# Patient Record
Sex: FEMALE | Race: OTHER
[De-identification: names, ages, dates, MRNs, and addresses within clinical notes are randomized per-mention and may not be internally consistent; named-entity substitution may affect disease eponyms.]

---

## 2021-01-11 ENCOUNTER — HOSPITAL ENCOUNTER (INPATIENT)
Dept: HOSPITAL 61 - ER | Age: 36
LOS: 3 days | Discharge: HOME | DRG: 871 | End: 2021-01-14
Attending: FAMILY MEDICINE | Admitting: FAMILY MEDICINE
Payer: OTHER GOVERNMENT

## 2021-01-11 VITALS — HEIGHT: 60 IN | WEIGHT: 190.48 LBS | BODY MASS INDEX: 37.4 KG/M2

## 2021-01-11 VITALS — DIASTOLIC BLOOD PRESSURE: 52 MMHG | SYSTOLIC BLOOD PRESSURE: 94 MMHG

## 2021-01-11 VITALS — DIASTOLIC BLOOD PRESSURE: 53 MMHG | SYSTOLIC BLOOD PRESSURE: 128 MMHG

## 2021-01-11 DIAGNOSIS — Z93.3: ICD-10-CM

## 2021-01-11 DIAGNOSIS — Z60.2: ICD-10-CM

## 2021-01-11 DIAGNOSIS — D50.9: ICD-10-CM

## 2021-01-11 DIAGNOSIS — E87.6: ICD-10-CM

## 2021-01-11 DIAGNOSIS — Z90.49: ICD-10-CM

## 2021-01-11 DIAGNOSIS — E53.8: ICD-10-CM

## 2021-01-11 DIAGNOSIS — B96.20: ICD-10-CM

## 2021-01-11 DIAGNOSIS — R13.10: ICD-10-CM

## 2021-01-11 DIAGNOSIS — F32.9: ICD-10-CM

## 2021-01-11 DIAGNOSIS — M19.90: ICD-10-CM

## 2021-01-11 DIAGNOSIS — A41.9: Primary | ICD-10-CM

## 2021-01-11 DIAGNOSIS — I21.4: ICD-10-CM

## 2021-01-11 DIAGNOSIS — Z82.49: ICD-10-CM

## 2021-01-11 DIAGNOSIS — E66.01: ICD-10-CM

## 2021-01-11 DIAGNOSIS — K59.00: ICD-10-CM

## 2021-01-11 DIAGNOSIS — K21.9: ICD-10-CM

## 2021-01-11 DIAGNOSIS — Z20.822: ICD-10-CM

## 2021-01-11 DIAGNOSIS — N39.0: ICD-10-CM

## 2021-01-11 DIAGNOSIS — Z98.84: ICD-10-CM

## 2021-01-11 LAB
% BANDS: 7 % (ref 0–9)
% LYMPHS: 9 % (ref 24–48)
% MONOS: 1 % (ref 0–10)
% SEGS: 83 % (ref 35–66)
ALBUMIN SERPL-MCNC: 2.9 G/DL (ref 3.4–5)
ALP SERPL-CCNC: 88 U/L (ref 46–116)
ALT SERPL-CCNC: 30 U/L (ref 14–59)
AMPHETAMINE/METHAMPHETAMINE: (no result)
ANION GAP SERPL CALC-SCNC: 10 MMOL/L (ref 6–14)
ANISOCYTOSIS BLD QL SMEAR: PRESENT
APTT PPP: (no result) S
AST SERPL-CCNC: 25 U/L (ref 15–37)
BACTERIA #/AREA URNS HPF: (no result) /HPF
BARBITURATES UR-MCNC: (no result) UG/ML
BASOPHILS # BLD AUTO: 0 X10^3/UL (ref 0–0.2)
BASOPHILS NFR BLD: 0 % (ref 0–3)
BENZODIAZ UR-MCNC: (no result) UG/L
BILIRUB DIRECT SERPL-MCNC: 0.1 MG/DL (ref 0–0.2)
BILIRUB SERPL-MCNC: 0.2 MG/DL (ref 0.2–1)
BILIRUB UR QL STRIP: (no result)
BUN SERPL-MCNC: 10 MG/DL (ref 7–20)
CALCIUM SERPL-MCNC: 8.8 MG/DL (ref 8.5–10.1)
CANNABINOIDS UR-MCNC: (no result) UG/L
CHLORIDE SERPL-SCNC: 102 MMOL/L (ref 98–107)
CK SERPL-CCNC: 73 U/L (ref 26–192)
CO2 SERPL-SCNC: 28 MMOL/L (ref 21–32)
COCAINE UR-MCNC: (no result) NG/ML
CREAT SERPL-MCNC: 0.9 MG/DL (ref 0.6–1)
EOSINOPHIL NFR BLD: 0.1 X10^3/UL (ref 0–0.7)
EOSINOPHIL NFR BLD: 1 % (ref 0–3)
ERYTHROCYTE [DISTWIDTH] IN BLOOD BY AUTOMATED COUNT: 19.8 % (ref 11.5–14.5)
FIBRINOGEN PPP-MCNC: (no result) MG/DL
GFR SERPLBLD BASED ON 1.73 SQ M-ARVRAT: 70.8 ML/MIN
GLUCOSE SERPL-MCNC: 97 MG/DL (ref 70–99)
HCT VFR BLD CALC: 33.7 % (ref 36–47)
HGB BLD-MCNC: 10.3 G/DL (ref 12–15.5)
HYPOCHROMIA BLD QL SMEAR: PRESENT
LIPASE: 72 U/L (ref 73–393)
LYMPHOCYTES # BLD: 1 X10^3/UL (ref 1–4.8)
LYMPHOCYTES NFR BLD AUTO: 8 % (ref 24–48)
MCH RBC QN AUTO: 22 PG (ref 25–35)
MCHC RBC AUTO-ENTMCNC: 31 G/DL (ref 31–37)
MCV RBC AUTO: 70 FL (ref 79–100)
METHADONE SERPL-MCNC: (no result) NG/ML
MICROCYTES BLD QL SMEAR: PRESENT
MONO #: 0.6 X10^3/UL (ref 0–1.1)
MONOCYTES NFR BLD: 4 % (ref 0–9)
NEUT #: 11.3 X10^3/UL (ref 1.8–7.7)
NEUTROPHILS NFR BLD AUTO: 87 % (ref 31–73)
NITRITE UR QL STRIP: POSITIVE
OPIATES UR-MCNC: (no result) NG/ML
PCP SERPL-MCNC: (no result) MG/DL
PH UR STRIP: 6 [PH]
PLATELET # BLD AUTO: 273 X10^3/UL (ref 140–400)
PLATELET # BLD EST: ADEQUATE 10*3/UL
POLYCHROMASIA BLD QL SMEAR: PRESENT
POTASSIUM SERPL-SCNC: 3.5 MMOL/L (ref 3.5–5.1)
PROT SERPL-MCNC: 7.6 G/DL (ref 6.4–8.2)
PROT UR STRIP-MCNC: 100 MG/DL
RBC # BLD AUTO: 4.8 X10^6/UL (ref 3.5–5.4)
RBC #/AREA URNS HPF: (no result) /HPF (ref 0–2)
SODIUM SERPL-SCNC: 140 MMOL/L (ref 136–145)
UROBILINOGEN UR-MCNC: 1 MG/DL
WBC # BLD AUTO: 13 X10^3/UL (ref 4–11)
WBC #/AREA URNS HPF: (no result) /HPF (ref 0–4)

## 2021-01-11 PROCEDURE — 84484 ASSAY OF TROPONIN QUANT: CPT

## 2021-01-11 PROCEDURE — 96374 THER/PROPH/DIAG INJ IV PUSH: CPT

## 2021-01-11 PROCEDURE — 80048 BASIC METABOLIC PNL TOTAL CA: CPT

## 2021-01-11 PROCEDURE — 74150 CT ABDOMEN W/O CONTRAST: CPT

## 2021-01-11 PROCEDURE — 85007 BL SMEAR W/DIFF WBC COUNT: CPT

## 2021-01-11 PROCEDURE — 93005 ELECTROCARDIOGRAM TRACING: CPT

## 2021-01-11 PROCEDURE — 80307 DRUG TEST PRSMV CHEM ANLYZR: CPT

## 2021-01-11 PROCEDURE — 96361 HYDRATE IV INFUSION ADD-ON: CPT

## 2021-01-11 PROCEDURE — 83540 ASSAY OF IRON: CPT

## 2021-01-11 PROCEDURE — 43235 EGD DIAGNOSTIC BRUSH WASH: CPT

## 2021-01-11 PROCEDURE — U0003 INFECTIOUS AGENT DETECTION BY NUCLEIC ACID (DNA OR RNA); SEVERE ACUTE RESPIRATORY SYNDROME CORONAVIRUS 2 (SARS-COV-2) (CORONAVIRUS DISEASE [COVID-19]), AMPLIFIED PROBE TECHNIQUE, MAKING USE OF HIGH THROUGHPUT TECHNOLOGIES AS DESCRIBED BY CMS-2020-01-R: HCPCS

## 2021-01-11 PROCEDURE — 80076 HEPATIC FUNCTION PANEL: CPT

## 2021-01-11 PROCEDURE — G0378 HOSPITAL OBSERVATION PER HR: HCPCS

## 2021-01-11 PROCEDURE — C9113 INJ PANTOPRAZOLE SODIUM, VIA: HCPCS

## 2021-01-11 PROCEDURE — 71250 CT THORAX DX C-: CPT

## 2021-01-11 PROCEDURE — 99285 EMERGENCY DEPT VISIT HI MDM: CPT

## 2021-01-11 PROCEDURE — 83690 ASSAY OF LIPASE: CPT

## 2021-01-11 PROCEDURE — 82607 VITAMIN B-12: CPT

## 2021-01-11 PROCEDURE — 80061 LIPID PANEL: CPT

## 2021-01-11 PROCEDURE — 83550 IRON BINDING TEST: CPT

## 2021-01-11 PROCEDURE — 87077 CULTURE AEROBIC IDENTIFY: CPT

## 2021-01-11 PROCEDURE — 81001 URINALYSIS AUTO W/SCOPE: CPT

## 2021-01-11 PROCEDURE — 94760 N-INVAS EAR/PLS OXIMETRY 1: CPT

## 2021-01-11 PROCEDURE — 96375 TX/PRO/DX INJ NEW DRUG ADDON: CPT

## 2021-01-11 PROCEDURE — 85025 COMPLETE CBC W/AUTO DIFF WBC: CPT

## 2021-01-11 PROCEDURE — 93306 TTE W/DOPPLER COMPLETE: CPT

## 2021-01-11 PROCEDURE — 83735 ASSAY OF MAGNESIUM: CPT

## 2021-01-11 PROCEDURE — 87086 URINE CULTURE/COLONY COUNT: CPT

## 2021-01-11 PROCEDURE — 81025 URINE PREGNANCY TEST: CPT

## 2021-01-11 PROCEDURE — 36415 COLL VENOUS BLD VENIPUNCTURE: CPT

## 2021-01-11 PROCEDURE — 82550 ASSAY OF CK (CPK): CPT

## 2021-01-11 PROCEDURE — 96376 TX/PRO/DX INJ SAME DRUG ADON: CPT

## 2021-01-11 PROCEDURE — 0DJ08ZZ INSPECTION OF UPPER INTESTINAL TRACT, VIA NATURAL OR ARTIFICIAL OPENING ENDOSCOPIC: ICD-10-PCS

## 2021-01-11 PROCEDURE — 96372 THER/PROPH/DIAG INJ SC/IM: CPT

## 2021-01-11 PROCEDURE — 74022 RADEX COMPL AQT ABD SERIES: CPT

## 2021-01-11 PROCEDURE — 87426 SARSCOV CORONAVIRUS AG IA: CPT

## 2021-01-11 PROCEDURE — 87186 SC STD MICRODIL/AGAR DIL: CPT

## 2021-01-11 RX ADMIN — BACITRACIN SCH MLS/HR: 5000 INJECTION, POWDER, FOR SOLUTION INTRAMUSCULAR at 13:50

## 2021-01-11 RX ADMIN — ASPIRIN SCH MG: 325 TABLET, DELAYED RELEASE ORAL at 13:50

## 2021-01-11 RX ADMIN — PANTOPRAZOLE SODIUM SCH MG: 40 INJECTION, POWDER, FOR SOLUTION INTRAVENOUS at 14:31

## 2021-01-11 RX ADMIN — ACETAMINOPHEN PRN MG: 325 TABLET, FILM COATED ORAL at 20:41

## 2021-01-11 RX ADMIN — ACETAMINOPHEN PRN MG: 325 TABLET, FILM COATED ORAL at 15:02

## 2021-01-11 RX ADMIN — ENOXAPARIN SODIUM SCH MG: 40 INJECTION SUBCUTANEOUS at 14:31

## 2021-01-11 SDOH — SOCIAL STABILITY - SOCIAL INSECURITY: PROBLEMS RELATED TO LIVING ALONE: Z60.2

## 2021-01-11 NOTE — RAD
CT scan of the chest and abdomen without contrast 1/11/2021



CLINICAL HISTORY: Abdominal pain. Tachycardia. Fever.



TECHNIQUE: Unenhanced, contiguous, 5 mm axial sections were obtained through the chest and abdomen.



One or more of the following individualized dose reduction techniques were utilized for this study:



1. Automated exposure control.

2. Adjustment of the mA and/or kV according to patient size.

3. Use of iterative reconstruction technique.





FINDINGS: Comparison is made to an acute abdominal series performed earlier today.



The heart and thoracic aorta are within normal limits. . No hilar, mediastinal or axillary lymphadeno
graham is seen.



Dependent subsegmental atelectasis is seen involving both lower lobes. No area of consolidation is se
en. No pneumothorax or pleural effusion is noted.



The liver, spleen, pancreas, adrenal glands and kidneys are within normal limits.



The abdominal aorta tapers normally. Surgical clips are seen within the gallbladder fossa consistent 
with a cholecystectomy. Postsurgical changes are seen consistent with a gastric bypass. No free fluid
 or free air is seen within the abdomen. There is no evidence of bowel obstruction. Minimal S-shaped 
curvature of the thoracolumbar spine is seen.



IMPRESSION: No acute abnormality is seen.



Electronically signed by: Sly Quintana MD (1/11/2021 8:10 PM) [id number]

## 2021-01-11 NOTE — PDOC2
GI CONSULT


Date of Service:


DATE: 21 


TIME: 14:38





Reason For Consult:


epigastric pain





HPI:


HPI:


36 y/o female seen in ER.





Went to a gathering in a garage on Saturday at noon.  Ate macaroni salad, 

meatloaf, rice, and had a shot of rum.  Soon after drinking the rum, she 

vomited.  Soon after that, epigastric pain began.


The pain is constant but worse with deep breathing or if she swallows saliva.  

Too afraid to try eating.  Has had "phlegm" in her throat all weekend but afraid

to really cough due to pain.  Given GI cocktail in ER - around the time she took

that, started feeling pain in back and neck.  Pain is described as "a hold."  





Denies reflux/heartburn, dysphagia, hematemesis, diarrhea, constipation, 

hematochezia, melena, and recent weight loss.





S/p Natalie-en-Y for weight loss in [place] in .  Had a "clot" in abdomen in 

 - required surgery to remove part of small bowel and had can ostomy for 

awhile (but has since been reversed).


Last EGD reportedly normal in [place] in 2017 or 2018.  No previous 

colonoscopy.


S/p cholecystectomy for gallstones.  No liver, pancreas, or PUD history


Denies NSAID use.


Usually gets B12 injections and iron infusions - last done in [place] on 20.  

Just moved to the  area.


Would like a warm blanket.





Labs note WBC 13, Hgb 10.3, MCV 70, RDW 19.8, ?UTI, troponin 0.138, normal LFTs,

normal lipase, negative tox screen.





PMH:


PMH:


anemia


appendectomy, cholecystectomy,  x 2, Natalie-en-Y, small bowel resection 

w/ ostomy/reversal





FH:


Family History:  No pertinent hx (denies GI cancers)





Social History:


Smoke:  No


ALCOHOL:  rare (once every 7 months)


Drugs:  None





ROS:





GEN: +fever


HEENT: +phlegm in throat


CV: Denies chest pain


RESP: Denies shortness of air, cough


GI: Per HPI


: +dark urine ("brown")


ENDO: Denies weight changes


NEURO: Denies confusion, dizziness


MSK: Denies weakness, joint pain/swelling


SKIN: Denies jaundice, pruritus





Vitals:


Vitals:





                                   Vital Signs








  Date Time  Temp Pulse Resp B/P (MAP) Pulse Ox O2 Delivery O2 Flow Rate FiO2


 


21 14:00 102.1 119 16 99/56 (70) 98 Room Air  





 102.1       











Labs:


Labs:





Laboratory Tests








Test


 21


11:09 21


11:14 21


12:10


 


Urine Collection Type Unknown   


 


Urine Color Klarissa   


 


Urine Clarity Hazy   


 


Urine pH 6.0 (<5.0-8.0)   


 


Urine Specific Gravity


 >=1.030


(1.000-1.030) 


 





 


Urine Protein


 100 mg/dL


(NEG-TRACE) 


 





 


Urine Glucose (UA)


 Negative mg/dL


(NEG) 


 





 


Urine Ketones (Stick) 15 mg/dL (NEG)   


 


Urine Blood Large (NEG)   


 


Urine Nitrite Positive (NEG)   


 


Urine Bilirubin Small (NEG)   


 


Urine Urobilinogen Dipstick


 1.0 mg/dL (0.2


mg/dL) 


 





 


Urine Leukocyte Esterase Trace (NEG)   


 


Urine RBC


 6-10 /HPF


(0-2) 


 





 


Urine WBC


 5-10 /HPF


(0-4) 


 





 


Urine Squamous Epithelial


Cells Mod /LPF 


 


 





 


Urine Bacteria


 Moderate /HPF


(0-FEW) 


 





 


Urine Mucus Mod /LPF   


 


Urine Opiates Screen Neg (NEG)   


 


Urine Methadone Screen Neg (NEG)   


 


Urine Barbiturates Neg (NEG)   


 


Urine Phencyclidine Screen Neg (NEG)   


 


Urine


Amphetamine/Methamphetamine Neg (NEG) 


 


 





 


Urine Benzodiazepines Screen Neg (NEG)   


 


Urine Cocaine Screen Neg (NEG)   


 


Urine Cannabinoids Screen Neg (NEG)   


 


Urine Ethyl Alcohol Neg (NEG)   


 


Bedside Urine HCG, Qualitative


 


 Hcg negative


(Negative) 





 


White Blood Count


 


 


 13.0 x10^3/uL


(4.0-11.0)


 


Red Blood Count


 


 


 4.80 x10^6/uL


(3.50-5.40)


 


Hemoglobin


 


 


 10.3 g/dL


(12.0-15.5)


 


Hematocrit


 


 


 33.7 %


(36.0-47.0)


 


Mean Corpuscular Volume   70 fL () 


 


Mean Corpuscular Hemoglobin   22 pg (25-35) 


 


Mean Corpuscular Hemoglobin


Concent 


 


 31 g/dL


(31-37)


 


Red Cell Distribution Width


 


 


 19.8 %


(11.5-14.5)


 


Platelet Count


 


 


 273 x10^3/uL


(140-400)


 


Neutrophils (%) (Auto)   87 % (31-73) 


 


Lymphocytes (%) (Auto)   8 % (24-48) 


 


Monocytes (%) (Auto)   4 % (0-9) 


 


Eosinophils (%) (Auto)   1 % (0-3) 


 


Basophils (%) (Auto)   0 % (0-3) 


 


Neutrophils # (Auto)


 


 


 11.3 x10^3/uL


(1.8-7.7)


 


Lymphocytes # (Auto)


 


 


 1.0 x10^3/uL


(1.0-4.8)


 


Monocytes # (Auto)


 


 


 0.6 x10^3/uL


(0.0-1.1)


 


Eosinophils # (Auto)


 


 


 0.1 x10^3/uL


(0.0-0.7)


 


Basophils # (Auto)


 


 


 0.0 x10^3/uL


(0.0-0.2)


 


Segmented Neutrophils %   83 % (35-66) 


 


Band Neutrophils %   7 % (0-9) 


 


Lymphocytes %   9 % (24-48) 


 


Monocytes %   1 % (0-10) 


 


Platelet Estimate


 


 


 Adequate


(ADEQUATE)


 


Polychromasia   Present 


 


Hypochromasia   Present 


 


Anisocytosis   Present 


 


Microcytosis   Present 


 


Sodium Level


 


 


 140 mmol/L


(136-145)


 


Potassium Level


 


 


 3.5 mmol/L


(3.5-5.1)


 


Chloride Level


 


 


 102 mmol/L


()


 


Carbon Dioxide Level


 


 


 28 mmol/L


(21-32)


 


Anion Gap   10 (6-14) 


 


Blood Urea Nitrogen


 


 


 10 mg/dL


(7-20)


 


Creatinine


 


 


 0.9 mg/dL


(0.6-1.0)


 


Estimated GFR


(Cockcroft-Gault) 


 


 70.8 





 


Glucose Level


 


 


 97 mg/dL


(70-99)


 


Calcium Level


 


 


 8.8 mg/dL


(8.5-10.1)


 


Total Bilirubin


 


 


 0.2 mg/dL


(0.2-1.0)


 


Direct Bilirubin


 


 


 0.1 mg/dL


(0.0-0.2)


 


Aspartate Amino Transf


(AST/SGOT) 


 


 25 U/L (15-37) 





 


Alanine Aminotransferase


(ALT/SGPT) 


 


 30 U/L (14-59) 





 


Alkaline Phosphatase


 


 


 88 U/L


()


 


Creatine Kinase


 


 


 73 U/L


()


 


Troponin I Quantitative


 


 


 0.138 ng/mL


(0.000-0.055)


 


Total Protein


 


 


 7.6 g/dL


(6.4-8.2)


 


Albumin


 


 


 2.9 g/dL


(3.4-5.0)


 


Lipase


 


 


 72 U/L


()











Allergies:


Coded Allergies:  


     No Known Drug Allergies (Unverified , 21)





Medications:





Current Medications








 Medications


  (Trade)  Dose


 Ordered  Sig/Reji


 Route


 PRN Reason  Start Time


 Stop Time Status Last Admin


Dose Admin


 


 Multi-Ingredient


 Mouthwash/Gargle


  (Gi Cocktail)  20 ml  1X  ONCE


 SWSW


   21 11:45


 21 11:46 DC 21 12:13





 


 Sodium Chloride  1,000 ml @ 


 75 mls/hr  [id number]


 IV


   21 13:45


    21 13:50





 


 Lorazepam


  (Ativan Inj)  2 mg  PRN Q4HRS  PRN


 IV


 ANXIETY / AGITATION  21 13:45


    21 13:56





 


 Enoxaparin Sodium


  (Lovenox 40mg


 Syringe)  40 mg  Q24H


 SQ


   21 14:00


    21 14:31





 


 Aspirin


  (Ecotrin)  325 mg  [id number]


 PO


   21 13:45


    21 13:50





 


 Pantoprazole


 Sodium


  (PROTONIX VIAL


 for IV PUSH)  40 mg  [id number]


 IVP


   21 13:45


    21 14:31





 


 Ceftriaxone Sodium


  (Rocephin)  1 gm  1X  ONCE


 IVP


   21 14:15


 21 14:16 DC 21 14:31














Imaging:


Imaging:


none





PE:





GEN: appears uncomfortable


HEENT: Atraumatic, PERRL


LUNGS: clear anteriorly - poor effort


HEART: tachycardic - HR 120s when I saw


ABD: quiet, soft, epigastric tenderness, midline scar


EXTREMITY: No edema


SKIN: many tattoos


NEURO/PSYCH: A & O 3





A/P:


A/P:


Vomiting (no recurrence), epigastric pain


Fever, tachycardia


Leukocytosis, microcytic anemia, ?UTI, mildly elevated troponin


S/p Natalie-en-Y


H/o "clot" requiring SB resection/ostomy


CRC screen - none, average risk


S/p cholecystectomy





--


Agree w/ IV PPI.  


Has cardiac diet ordered - would keep NPO.


Abd x-ray ordered per Dr. Phelps - await this.


Will return to see w/ Dr. Jade.











ROMELIA CUEVA         2021 14:56

## 2021-01-11 NOTE — PDOC1
History and Physical


Date of Admission


Date of Admission


DATE: 21 


TIME: 13:05





Identification/Chief Complaint


Chief Complaint


epigastric pain after etoh intake. fever





History of Present Illness


History of Present Illness


 SEEN IN ER WITH ELEVATED TROPONIN ,  epigastric pain.  Patient denies any 

nausea or vomiting.  Patient states her epigastric pain increases when she eats 

or she drinks   





she took Tylenol 650 mg OTC p.o. this morning without relief of pain.  Patient 

denies shortness of breath chest congestion or chest pains.  Patient  DENIES 

constipation or diarrhea.  Patient states her last normal BM was 5 days ago and 

this is normal for her.  





 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 

bypass, states she wore a colostomy for about 2 months, and no longer wears a 

colostomy as it was revised.





Past Medical History


Past Medical History


                                                            


 Past Medical History 


Past Medical History


Past Medical History:  No Pertinent History


Past Surgical History:  Appendectomy, Cholecystectomy, , Other


Additional Past Surgical Histo:  gastric bypass for mesenteric clot;


Smoking Status:  Never Smoker


Alcohol Use:  Occasionally


Drug Use:  None








fhx obesity


GI:  Gastritis


Heme/Onc:  No pertinent hx





Family History


Family History:  Alcohol Abuse, Hypertension





Social History


Smoke:  No


ALCOHOL:  social


Drugs:  None





Current Medications


Current Medications





Current Medications


Multi-Ingredient Mouthwash/Gargle (Gi Cocktail) 20 ml 1X  ONCE SWSW  Last 

administered on 21at 12:13;  Start 21 at 11:45;  Stop 21 at 

11:46;  Status DC





Allergies


Allergies:  


Coded Allergies:  


     No Known Drug Allergies (Unverified , 21)





ROS


General:  No: Chills, Night Sweats, Fatigue, Malaise, Appetite, Other


PSYCHOLOGICAL ROS:  YES: Anxiety; 


   No: Behavioral Disorder, Concentration difficultie, Decreased libido, 

Depression, Disorientation, Hallucinations, Hostility, Irritablity, Memory 

difficulties, Mood Swings, Obsessive thoughts, Physical abuse, Sexual abuse, 

Sleep disturbances, Suicidal ideation, Other


Eyes:  No Blurry vision, No Decreased vision, No Double vision, No Dry eyes, No 

Excessive tearing, No Eye Pain, No Itchy Eyes, No Loss of vision, No 

Photophobia, No Scotomata, No Uses contacts, No Uses glasses, No Other


HEENT:  No: Heacaches, Visual Changes, Hearing change, Nasal congestion, Nasal 

discharge, Oral lesions, Sinus pain, Sore Throat, Epistaxis, Sneezing, Snoring, 

Tinnitus, Vertigo, Vocal changes, Other


ALLERGY AND IMMUNOLOGY:  No: Hives, Insect Bite Sensitivity, Itchy/Watery Eyes, 

Nasal Congestion, Post Nasal Drip, Seasonal Allergies, Other


Hematological and Lymphatic:  No: Bleeding Problems, Blood Clots, Blood 

Transfusions, Brusing, Night Sweats, Pallor, Swollen Lymph Nodes, Other


ENDOCRINE:  No: Breast Changes, Galactorrhea, Hair Pattern Changes, Hot Flashes,

Malaise/lethargy, Mood Swings, Palpitations, Polydipsia/polyuria, Skin Changes, 

Temperature Intolerance, Unexpected Weight Changes, Other


Respiratory:  No: Cough, Hemoptysis, Orthopnea, Pleuritic Pain, Shortness of 

breath, SOB with excertion, Sputum Changes, Stridor, Tachypnea, Wheezing, Other


Gastrointestinal:  Yes Abdominal Pain; 


   No Nausea, No Vomiting, No Diarrhea, No Constipation, No Melena, No 

Hematochezia, No Other


Genitourinary:  No Dysuria, No Frequency, No Incontinence, No Hematuria, No 

Retention, No Discharge, No Urgency, No Pain, No Flank Pain, No Other, No , No ,

No , No , No , No , No 


Musculoskeletal:  No Gait Disturbance, No Joint Pain, No Joint Stiffness, No 

Joint Swelling, No Muscle Pain, No Muscular Weakness, No Pain In:, No Swelling 

In:, No Other


Neurological:  No Behavorial Changes, No Bowel/Bladder ControlChng, No 

Confusion, No Dizziness, No Gait Disturbance, No Headaches, No Impaired 

Coord/balance, No Memory Loss, No Numbness/Tingling, No Seizures, No Speech 

Problems, No Tremors, No Visual Changes, No Weakness, No Other


Skin:  No Dry Skin, No Eczema, No Hair Changes, No Lumps, No Mole Changes, No 

Mottling, No Nail Changes, No Pruritus, No Rash, No Skin Lesion Changes, No 

Other, No Acne





Physical Exam


Physical Exam


onstitutional: Well developed, well nourished, mild acute distress, non-toxic 

appearance. 


HENT: Normocephalic, atraumatic, bilateral external ears normal, oropharynx 

moist, no oral exudates, nose normal. 


Eyes: PERRLA, EOMI, conjunctiva normal, no discharge.  


Neck: Normal range of motion, no tenderness, supple, no stridor.  


Cardiovascular:Heart rate regular rhythm, no murmur, heart sounds S1-S2 to auscu

ltation.


Lungs & Thorax:  Bilateral breath sounds clear to auscultation all lung fields.


Abdomen: Bowel sounds normal, soft, no tenderness, no masses, no pulsatile 

masses.  Pain to palpation over epigastric area.  Abdominal surgical scars well-

healed.


Skin: Warm, dry, no erythema, no rash.  


Back: No tenderness, no CVA tenderness.  


Extremities: No tenderness, no cyanosis, no clubbing, ROM intact, no edema.  


Neurologic: Alert and oriented X 3, normal motor function, normal sensory 

function, no focal deficits noted. 


Psychologic: Affect normal, judgment normal, mood normal.


General:  Alert, Oriented X3, Cooperative, mild distress


HEENT:  EOMI, Mucous membr. moist/pink


Lungs:  Clear to auscultation


Heart:  RRR


Breasts:  Not examined


Abdomen:  Normal bowel sounds, No hepatosplenomegaly


Rectal Exam:  not examined


PELVIC:  Examination not indicated


Extremities:  No clubbing, No cyanosis, Normal pulses


Skin:  No rashes


Neuro:  Normal speech, Sensation intact, Cranial nerves 3-12 NL


Psych/Mental Status:  Mental status NL, Mood NL





Vitals


Vitals





Vital Signs








  Date Time  Temp Pulse Resp B/P (MAP) Pulse Ox O2 Delivery O2 Flow Rate FiO2


 


21 11:08 98.0 108 20 135/58 100   





 98.0       











Labs


Labs





Laboratory Tests








Test


 21


11:09 21


11:14 21


12:10


 


Urine Collection Type Unknown   


 


Urine Color Klarissa   


 


Urine Clarity Hazy   


 


Urine pH 6.0 (<5.0-8.0)   


 


Urine Specific Gravity


 >=1.030


(1.000-1.030) 


 





 


Urine Protein


 100 mg/dL


(NEG-TRACE) 


 





 


Urine Glucose (UA)


 Negative mg/dL


(NEG) 


 





 


Urine Ketones (Stick) 15 mg/dL (NEG)   


 


Urine Blood Large (NEG)   


 


Urine Nitrite Positive (NEG)   


 


Urine Bilirubin Small (NEG)   


 


Urine Urobilinogen Dipstick


 1.0 mg/dL (0.2


mg/dL) 


 





 


Urine Leukocyte Esterase Trace (NEG)   


 


Urine RBC


 6-10 /HPF


(0-2) 


 





 


Urine WBC


 5-10 /HPF


(0-4) 


 





 


Urine Squamous Epithelial


Cells Mod /LPF 


 


 





 


Urine Bacteria


 Moderate /HPF


(0-FEW) 


 





 


Urine Mucus Mod /LPF   


 


Urine Opiates Screen Neg (NEG)   


 


Urine Methadone Screen Neg (NEG)   


 


Urine Barbiturates Neg (NEG)   


 


Urine Phencyclidine Screen Neg (NEG)   


 


Urine


Amphetamine/Methamphetamine Neg (NEG) 


 


 





 


Urine Benzodiazepines Screen Neg (NEG)   


 


Urine Cocaine Screen Neg (NEG)   


 


Urine Cannabinoids Screen Neg (NEG)   


 


Urine Ethyl Alcohol Neg (NEG)   


 


Bedside Urine HCG, Qualitative


 


 Hcg negative


(Negative) 





 


White Blood Count


 


 


 13.0 x10^3/uL


(4.0-11.0)


 


Red Blood Count


 


 


 4.80 x10^6/uL


(3.50-5.40)


 


Hemoglobin


 


 


 10.3 g/dL


(12.0-15.5)


 


Hematocrit


 


 


 33.7 %


(36.0-47.0)


 


Mean Corpuscular Volume   70 fL () 


 


Mean Corpuscular Hemoglobin   22 pg (25-35) 


 


Mean Corpuscular Hemoglobin


Concent 


 


 31 g/dL


(31-37)


 


Red Cell Distribution Width


 


 


 19.8 %


(11.5-14.5)


 


Platelet Count


 


 


 273 x10^3/uL


(140-400)


 


Neutrophils (%) (Auto)   87 % (31-73) 


 


Lymphocytes (%) (Auto)   8 % (24-48) 


 


Monocytes (%) (Auto)   4 % (0-9) 


 


Eosinophils (%) (Auto)   1 % (0-3) 


 


Basophils (%) (Auto)   0 % (0-3) 


 


Neutrophils # (Auto)


 


 


 11.3 x10^3/uL


(1.8-7.7)


 


Lymphocytes # (Auto)


 


 


 1.0 x10^3/uL


(1.0-4.8)


 


Monocytes # (Auto)


 


 


 0.6 x10^3/uL


(0.0-1.1)


 


Eosinophils # (Auto)


 


 


 0.1 x10^3/uL


(0.0-0.7)


 


Basophils # (Auto)


 


 


 0.0 x10^3/uL


(0.0-0.2)


 


Segmented Neutrophils %   83 % (35-66) 


 


Band Neutrophils %   7 % (0-9) 


 


Lymphocytes %   9 % (24-48) 


 


Monocytes %   1 % (0-10) 


 


Platelet Estimate


 


 


 Adequate


(ADEQUATE)


 


Polychromasia   Present 


 


Hypochromasia   Present 


 


Anisocytosis   Present 


 


Microcytosis   Present 


 


Sodium Level


 


 


 140 mmol/L


(136-145)


 


Potassium Level


 


 


 3.5 mmol/L


(3.5-5.1)


 


Chloride Level


 


 


 102 mmol/L


()


 


Carbon Dioxide Level


 


 


 28 mmol/L


(21-32)


 


Anion Gap   10 (6-14) 


 


Blood Urea Nitrogen


 


 


 10 mg/dL


(7-20)


 


Creatinine


 


 


 0.9 mg/dL


(0.6-1.0)


 


Estimated GFR


(Cockcroft-Gault) 


 


 70.8 





 


Glucose Level


 


 


 97 mg/dL


(70-99)


 


Calcium Level


 


 


 8.8 mg/dL


(8.5-10.1)


 


Total Bilirubin


 


 


 0.2 mg/dL


(0.2-1.0)


 


Direct Bilirubin


 


 


 0.1 mg/dL


(0.0-0.2)


 


Aspartate Amino Transf


(AST/SGOT) 


 


 25 U/L (15-37) 





 


Alanine Aminotransferase


(ALT/SGPT) 


 


 30 U/L (14-59) 





 


Alkaline Phosphatase


 


 


 88 U/L


()


 


Creatine Kinase


 


 


 73 U/L


()


 


Troponin I Quantitative


 


 


 0.138 ng/mL


(0.000-0.055)


 


Total Protein


 


 


 7.6 g/dL


(6.4-8.2)


 


Albumin


 


 


 2.9 g/dL


(3.4-5.0)


 


Lipase


 


 


 72 U/L


()








Laboratory Tests








Test


 21


11:09 21


11:14 21


12:10


 


Urine Collection Type Unknown   


 


Urine Color Klarissa   


 


Urine Clarity Hazy   


 


Urine pH 6.0 (<5.0-8.0)   


 


Urine Specific Gravity


 >=1.030


(1.000-1.030) 


 





 


Urine Protein


 100 mg/dL


(NEG-TRACE) 


 





 


Urine Glucose (UA)


 Negative mg/dL


(NEG) 


 





 


Urine Ketones (Stick) 15 mg/dL (NEG)   


 


Urine Blood Large (NEG)   


 


Urine Nitrite Positive (NEG)   


 


Urine Bilirubin Small (NEG)   


 


Urine Urobilinogen Dipstick


 1.0 mg/dL (0.2


mg/dL) 


 





 


Urine Leukocyte Esterase Trace (NEG)   


 


Urine RBC


 6-10 /HPF


(0-2) 


 





 


Urine WBC


 5-10 /HPF


(0-4) 


 





 


Urine Squamous Epithelial


Cells Mod /LPF 


 


 





 


Urine Bacteria


 Moderate /HPF


(0-FEW) 


 





 


Urine Mucus Mod /LPF   


 


Urine Opiates Screen Neg (NEG)   


 


Urine Methadone Screen Neg (NEG)   


 


Urine Barbiturates Neg (NEG)   


 


Urine Phencyclidine Screen Neg (NEG)   


 


Urine


Amphetamine/Methamphetamine Neg (NEG) 


 


 





 


Urine Benzodiazepines Screen Neg (NEG)   


 


Urine Cocaine Screen Neg (NEG)   


 


Urine Cannabinoids Screen Neg (NEG)   


 


Urine Ethyl Alcohol Neg (NEG)   


 


Bedside Urine HCG, Qualitative


 


 Hcg negative


(Negative) 





 


White Blood Count


 


 


 13.0 x10^3/uL


(4.0-11.0)


 


Red Blood Count


 


 


 4.80 x10^6/uL


(3.50-5.40)


 


Hemoglobin


 


 


 10.3 g/dL


(12.0-15.5)


 


Hematocrit


 


 


 33.7 %


(36.0-47.0)


 


Mean Corpuscular Volume   70 fL () 


 


Mean Corpuscular Hemoglobin   22 pg (25-35) 


 


Mean Corpuscular Hemoglobin


Concent 


 


 31 g/dL


(31-37)


 


Red Cell Distribution Width


 


 


 19.8 %


(11.5-14.5)


 


Platelet Count


 


 


 273 x10^3/uL


(140-400)


 


Neutrophils (%) (Auto)   87 % (31-73) 


 


Lymphocytes (%) (Auto)   8 % (24-48) 


 


Monocytes (%) (Auto)   4 % (0-9) 


 


Eosinophils (%) (Auto)   1 % (0-3) 


 


Basophils (%) (Auto)   0 % (0-3) 


 


Neutrophils # (Auto)


 


 


 11.3 x10^3/uL


(1.8-7.7)


 


Lymphocytes # (Auto)


 


 


 1.0 x10^3/uL


(1.0-4.8)


 


Monocytes # (Auto)


 


 


 0.6 x10^3/uL


(0.0-1.1)


 


Eosinophils # (Auto)


 


 


 0.1 x10^3/uL


(0.0-0.7)


 


Basophils # (Auto)


 


 


 0.0 x10^3/uL


(0.0-0.2)


 


Segmented Neutrophils %   83 % (35-66) 


 


Band Neutrophils %   7 % (0-9) 


 


Lymphocytes %   9 % (24-48) 


 


Monocytes %   1 % (0-10) 


 


Platelet Estimate


 


 


 Adequate


(ADEQUATE)


 


Polychromasia   Present 


 


Hypochromasia   Present 


 


Anisocytosis   Present 


 


Microcytosis   Present 


 


Sodium Level


 


 


 140 mmol/L


(136-145)


 


Potassium Level


 


 


 3.5 mmol/L


(3.5-5.1)


 


Chloride Level


 


 


 102 mmol/L


()


 


Carbon Dioxide Level


 


 


 28 mmol/L


(21-32)


 


Anion Gap   10 (6-14) 


 


Blood Urea Nitrogen


 


 


 10 mg/dL


(7-20)


 


Creatinine


 


 


 0.9 mg/dL


(0.6-1.0)


 


Estimated GFR


(Cockcroft-Gault) 


 


 70.8 





 


Glucose Level


 


 


 97 mg/dL


(70-99)


 


Calcium Level


 


 


 8.8 mg/dL


(8.5-10.1)


 


Total Bilirubin


 


 


 0.2 mg/dL


(0.2-1.0)


 


Direct Bilirubin


 


 


 0.1 mg/dL


(0.0-0.2)


 


Aspartate Amino Transf


(AST/SGOT) 


 


 25 U/L (15-37) 





 


Alanine Aminotransferase


(ALT/SGPT) 


 


 30 U/L (14-59) 





 


Alkaline Phosphatase


 


 


 88 U/L


()


 


Creatine Kinase


 


 


 73 U/L


()


 


Troponin I Quantitative


 


 


 0.138 ng/mL


(0.000-0.055)


 


Total Protein


 


 


 7.6 g/dL


(6.4-8.2)


 


Albumin


 


 


 2.9 g/dL


(3.4-5.0)


 


Lipase


 


 


 72 U/L


()











VTE Prophylaxis Ordered


VTE Prophylaxis Devices:  Yes


VTE Pharmacological Prophylaxi:  Yes





Assessment/Plan


Assessment/Plan


impression





1. INTRACTABLE EPIGASTRIC PAIN, probable alcohol induced gastritis


2. elevated troponin i, //suspect stress induced ischemia type 2


3. morbid obesity


4. moderate sporatic alcohol use


5. remote mesenteric clot














plan===============





admit


ASA


NPO


IV FLUID SUPPORT


GI CONSULT


IV PROTONIX 40 MG Q 24 HRS


Cardiology consult


dvt prophylaxis, sq heparin


trend troponin i


echo


cvc bed





Justifications for Admission


Other Justification














DELFINA CRUZ MD          2021 13:06

## 2021-01-11 NOTE — RAD
2 view abdominal series and PA view chest x-ray



Clinical indications: Fever and epigastric pain. History of gastric bypass 2011.



FINDINGS: Mildly dilated loops of small bowel are seen within the abdomen which is not out of proport
ion to the amount of air within the colon.. Small multiple air-fluid levels are seen. Mild fecal rete
ntion is seen throughout the colon and rectosigmoid region. No free intraperitoneal air is seen. Surg
ical clips are seen within left upper quadrant abdomen and cholecystectomy clips are apparent.



Chest x-ray demonstrates no acute lung infiltrate or pulmonary edema or pleural effusion or pneumotho
rax. Heart size and pulmonary vasculature and mediastinum and both yon are unremarkable.



IMPRESSION: Air-fluid levels which may be indicative of enteritis.



Electronically signed by: Jean Jarvis MD (1/11/2021 3:27 PM) [id number]

## 2021-01-11 NOTE — NUR
Patient arrived to room 209 via bed from ER at 1700. Patient A&OX4. Patient states she is 
only in pain when she swallows. 



The patient, FRANCISCO CASTELLANOS, 34 y/o, F admitted by DELFINA CRUZ MD, was given 
written information regarding hospital policies, unit procedures and contact persons.  



Valuables were checked and noted.



Will continue to monitor.

## 2021-01-12 VITALS — SYSTOLIC BLOOD PRESSURE: 97 MMHG | DIASTOLIC BLOOD PRESSURE: 52 MMHG

## 2021-01-12 VITALS — SYSTOLIC BLOOD PRESSURE: 102 MMHG | DIASTOLIC BLOOD PRESSURE: 60 MMHG

## 2021-01-12 VITALS — SYSTOLIC BLOOD PRESSURE: 103 MMHG | DIASTOLIC BLOOD PRESSURE: 64 MMHG

## 2021-01-12 VITALS — SYSTOLIC BLOOD PRESSURE: 93 MMHG | DIASTOLIC BLOOD PRESSURE: 55 MMHG

## 2021-01-12 VITALS — DIASTOLIC BLOOD PRESSURE: 56 MMHG | SYSTOLIC BLOOD PRESSURE: 108 MMHG

## 2021-01-12 VITALS — DIASTOLIC BLOOD PRESSURE: 62 MMHG | SYSTOLIC BLOOD PRESSURE: 85 MMHG

## 2021-01-12 VITALS — DIASTOLIC BLOOD PRESSURE: 65 MMHG | SYSTOLIC BLOOD PRESSURE: 127 MMHG

## 2021-01-12 VITALS — SYSTOLIC BLOOD PRESSURE: 100 MMHG | DIASTOLIC BLOOD PRESSURE: 53 MMHG

## 2021-01-12 VITALS — DIASTOLIC BLOOD PRESSURE: 55 MMHG | SYSTOLIC BLOOD PRESSURE: 96 MMHG

## 2021-01-12 VITALS — DIASTOLIC BLOOD PRESSURE: 56 MMHG | SYSTOLIC BLOOD PRESSURE: 94 MMHG

## 2021-01-12 VITALS — DIASTOLIC BLOOD PRESSURE: 56 MMHG | SYSTOLIC BLOOD PRESSURE: 120 MMHG

## 2021-01-12 VITALS — DIASTOLIC BLOOD PRESSURE: 59 MMHG | SYSTOLIC BLOOD PRESSURE: 111 MMHG

## 2021-01-12 VITALS — DIASTOLIC BLOOD PRESSURE: 82 MMHG | SYSTOLIC BLOOD PRESSURE: 122 MMHG

## 2021-01-12 LAB
BASOPHILS # BLD AUTO: 0.1 X10^3/UL (ref 0–0.2)
BASOPHILS NFR BLD: 1 % (ref 0–3)
CHOLEST SERPL-MCNC: 157 MG/DL (ref 0–200)
CHOLEST/HDLC SERPL: 5.2 {RATIO}
EOSINOPHIL NFR BLD: 0.2 X10^3/UL (ref 0–0.7)
EOSINOPHIL NFR BLD: 2 % (ref 0–3)
ERYTHROCYTE [DISTWIDTH] IN BLOOD BY AUTOMATED COUNT: 19.8 % (ref 11.5–14.5)
HCT VFR BLD CALC: 31.2 % (ref 36–47)
HDLC SERPL-MCNC: 30 MG/DL (ref 40–60)
HGB BLD-MCNC: 9.8 G/DL (ref 12–15.5)
LDLC: 101 MG/DL (ref 0–100)
LYMPHOCYTES # BLD: 1.4 X10^3/UL (ref 1–4.8)
LYMPHOCYTES NFR BLD AUTO: 14 % (ref 24–48)
MCH RBC QN AUTO: 22 PG (ref 25–35)
MCHC RBC AUTO-ENTMCNC: 32 G/DL (ref 31–37)
MCV RBC AUTO: 70 FL (ref 79–100)
MONO #: 0.4 X10^3/UL (ref 0–1.1)
MONOCYTES NFR BLD: 4 % (ref 0–9)
NEUT #: 7.8 X10^3/UL (ref 1.8–7.7)
NEUTROPHILS NFR BLD AUTO: 79 % (ref 31–73)
PLATELET # BLD AUTO: 252 X10^3/UL (ref 140–400)
RBC # BLD AUTO: 4.45 X10^6/UL (ref 3.5–5.4)
TRIGL SERPL-MCNC: 128 MG/DL (ref 0–150)
VLDLC: 26 MG/DL (ref 0–40)
WBC # BLD AUTO: 9.8 X10^3/UL (ref 4–11)

## 2021-01-12 RX ADMIN — ACETAMINOPHEN PRN MG: 325 TABLET, FILM COATED ORAL at 08:35

## 2021-01-12 RX ADMIN — BACITRACIN SCH MLS/HR: 5000 INJECTION, POWDER, FOR SOLUTION INTRAMUSCULAR at 15:01

## 2021-01-12 RX ADMIN — BACITRACIN SCH MLS/HR: 5000 INJECTION, POWDER, FOR SOLUTION INTRAMUSCULAR at 00:45

## 2021-01-12 RX ADMIN — PANTOPRAZOLE SODIUM SCH MG: 40 INJECTION, POWDER, FOR SOLUTION INTRAVENOUS at 08:36

## 2021-01-12 RX ADMIN — ASPIRIN SCH MG: 325 TABLET, DELAYED RELEASE ORAL at 08:35

## 2021-01-12 RX ADMIN — ENOXAPARIN SODIUM SCH MG: 40 INJECTION SUBCUTANEOUS at 18:29

## 2021-01-12 RX ADMIN — CEFTRIAXONE SCH GM: 1 INJECTION, POWDER, FOR SOLUTION INTRAMUSCULAR; INTRAVENOUS at 08:00

## 2021-01-12 NOTE — NUR
SS following for discharge planning. SS reviewed pt chart and discussed with pt RN. Pt is 
from home with spouse and is currently on room air. Pt on IV Rocephin. COVID19 negative. Pt 
having EGD. Self pay. SS will continue to follow for discharge planning.

## 2021-01-12 NOTE — PDOC
[facility] [facility] PROGRESS NOTE


Date of Service


DOS:


DATE: 1/12/21 


TIME: 10:24





Chief Complaint


Chief Complaint


Chest pain, elevated troponin, N STEMI, UTI





History of Present Illness


History of Present Illness


1/12: 


-Patient seen and examined. 


-Noble RN. Noble . 


-Chart reviewed.





Vitals/I&O


Vitals/I&O:





                                   Vital Signs








  Date Time  Temp Pulse Resp B/P (MAP) Pulse Ox O2 Delivery O2 Flow Rate FiO2


 


1/12/21 09:05  113  102/60 (74)    


 


1/12/21 07:00 99.7  24  100 Room Air  





 99.7       














                                    I & O   


 


 1/11/21 1/11/21 1/12/21





 15:00 23:00 07:00


 


Intake Total   0 ml


 


Output Total  150 ml 


 


Balance  -150 ml 0 ml











Physical Exam


General:  Alert, Oriented X3, Cooperative, mild distress


Abdomen:  Normal bowel sounds, No hepatosplenomegaly


Extremities:  No clubbing, No cyanosis, Normal pulses


Skin:  No rashes





Labs


Labs:





Laboratory Tests








Test


 1/11/21


11:09 1/11/21


11:14 1/11/21


12:10 1/11/21


14:51


 


Urine Collection Type Unknown    


 


Urine Color Klarissa    


 


Urine Clarity Hazy    


 


Urine pH 6.0 (<5.0-8.0)    


 


Urine Specific Gravity


 >=1.030


(1.000-1.030) 


 


 





 


Urine Protein


 100 mg/dL


(NEG-TRACE) 


 


 





 


Urine Glucose (UA)


 Negative mg/dL


(NEG) 


 


 





 


Urine Ketones (Stick) 15 mg/dL (NEG)    


 


Urine Blood Large (NEG)    


 


Urine Nitrite Positive (NEG)    


 


Urine Bilirubin Small (NEG)    


 


Urine Urobilinogen Dipstick


 1.0 mg/dL (0.2


mg/dL) 


 


 





 


Urine Leukocyte Esterase Trace (NEG)    


 


Urine RBC


 6-10 /HPF


(0-2) 


 


 





 


Urine WBC


 5-10 /HPF


(0-4) 


 


 





 


Urine Squamous Epithelial


Cells Mod /LPF 


 


 


 





 


Urine Bacteria


 Moderate /HPF


(0-FEW) 


 


 





 


Urine Mucus Mod /LPF    


 


Urine Opiates Screen Neg (NEG)    


 


Urine Methadone Screen Neg (NEG)    


 


Urine Barbiturates Neg (NEG)    


 


Urine Phencyclidine Screen Neg (NEG)    


 


Urine


Amphetamine/Methamphetamine Neg (NEG) 


 


 


 





 


Urine Benzodiazepines Screen Neg (NEG)    


 


Urine Cocaine Screen Neg (NEG)    


 


Urine Cannabinoids Screen Neg (NEG)    


 


Urine Ethyl Alcohol Neg (NEG)    


 


Bedside Urine HCG, Qualitative


 


 Hcg negative


(Negative) 


 





 


White Blood Count


 


 


 13.0 x10^3/uL


(4.0-11.0) 





 


Red Blood Count


 


 


 4.80 x10^6/uL


(3.50-5.40) 





 


Hemoglobin


 


 


 10.3 g/dL


(12.0-15.5) 





 


Hematocrit


 


 


 33.7 %


(36.0-47.0) 





 


Mean Corpuscular Volume   70 fL ()  


 


Mean Corpuscular Hemoglobin   22 pg (25-35)  


 


Mean Corpuscular Hemoglobin


Concent 


 


 31 g/dL


(31-37) 





 


Red Cell Distribution Width


 


 


 19.8 %


(11.5-14.5) 





 


Platelet Count


 


 


 273 x10^3/uL


(140-400) 





 


Neutrophils (%) (Auto)   87 % (31-73)  


 


Lymphocytes (%) (Auto)   8 % (24-48)  


 


Monocytes (%) (Auto)   4 % (0-9)  


 


Eosinophils (%) (Auto)   1 % (0-3)  


 


Basophils (%) (Auto)   0 % (0-3)  


 


Neutrophils # (Auto)


 


 


 11.3 x10^3/uL


(1.8-7.7) 





 


Lymphocytes # (Auto)


 


 


 1.0 x10^3/uL


(1.0-4.8) 





 


Monocytes # (Auto)


 


 


 0.6 x10^3/uL


(0.0-1.1) 





 


Eosinophils # (Auto)


 


 


 0.1 x10^3/uL


(0.0-0.7) 





 


Basophils # (Auto)


 


 


 0.0 x10^3/uL


(0.0-0.2) 





 


Segmented Neutrophils %   83 % (35-66)  


 


Band Neutrophils %   7 % (0-9)  


 


Lymphocytes %   9 % (24-48)  


 


Monocytes %   1 % (0-10)  


 


Platelet Estimate


 


 


 Adequate


(ADEQUATE) 





 


Polychromasia   Present  


 


Hypochromasia   Present  


 


Anisocytosis   Present  


 


Microcytosis   Present  


 


Sodium Level


 


 


 140 mmol/L


(136-145) 





 


Potassium Level


 


 


 3.5 mmol/L


(3.5-5.1) 





 


Chloride Level


 


 


 102 mmol/L


() 





 


Carbon Dioxide Level


 


 


 28 mmol/L


(21-32) 





 


Anion Gap   10 (6-14)  


 


Blood Urea Nitrogen


 


 


 10 mg/dL


(7-20) 





 


Creatinine


 


 


 0.9 mg/dL


(0.6-1.0) 





 


Estimated GFR


(Cockcroft-Gault) 


 


 70.8 


 





 


Glucose Level


 


 


 97 mg/dL


(70-99) 





 


Calcium Level


 


 


 8.8 mg/dL


(8.5-10.1) 





 


Total Bilirubin


 


 


 0.2 mg/dL


(0.2-1.0) 





 


Direct Bilirubin


 


 


 0.1 mg/dL


(0.0-0.2) 





 


Aspartate Amino Transf


(AST/SGOT) 


 


 25 U/L (15-37) 


 





 


Alanine Aminotransferase


(ALT/SGPT) 


 


 30 U/L (14-59) 


 





 


Alkaline Phosphatase


 


 


 88 U/L


() 





 


Creatine Kinase


 


 


 73 U/L


() 





 


Troponin I Quantitative


 


 


 0.138 ng/mL


(0.000-0.055) 0.159 ng/mL


(0.000-0.055)


 


Total Protein


 


 


 7.6 g/dL


(6.4-8.2) 





 


Albumin


 


 


 2.9 g/dL


(3.4-5.0) 





 


Lipase


 


 


 72 U/L


() 





 


Test


 1/12/21


07:20 


 


 





 


White Blood Count


 9.8 x10^3/uL


(4.0-11.0) 


 


 





 


Red Blood Count


 4.45 x10^6/uL


(3.50-5.40) 


 


 





 


Hemoglobin


 9.8 g/dL


(12.0-15.5) 


 


 





 


Hematocrit


 31.2 %


(36.0-47.0) 


 


 





 


Mean Corpuscular Volume 70 fL ()    


 


Mean Corpuscular Hemoglobin 22 pg (25-35)    


 


Mean Corpuscular Hemoglobin


Concent 32 g/dL


(31-37) 


 


 





 


Red Cell Distribution Width


 19.8 %


(11.5-14.5) 


 


 





 


Platelet Count


 252 x10^3/uL


(140-400) 


 


 





 


Neutrophils (%) (Auto) 79 % (31-73)    


 


Lymphocytes (%) (Auto) 14 % (24-48)    


 


Monocytes (%) (Auto) 4 % (0-9)    


 


Eosinophils (%) (Auto) 2 % (0-3)    


 


Basophils (%) (Auto) 1 % (0-3)    


 


Neutrophils # (Auto)


 7.8 x10^3/uL


(1.8-7.7) 


 


 





 


Lymphocytes # (Auto)


 1.4 x10^3/uL


(1.0-4.8) 


 


 





 


Monocytes # (Auto)


 0.4 x10^3/uL


(0.0-1.1) 


 


 





 


Eosinophils # (Auto)


 0.2 x10^3/uL


(0.0-0.7) 


 


 





 


Basophils # (Auto)


 0.1 x10^3/uL


(0.0-0.2) 


 


 





 


Creatine Kinase


 49 U/L


() 


 


 





 


Troponin I Quantitative


 0.058 ng/mL


(0.000-0.055) 


 


 





 


Triglycerides Level


 128 mg/dL


(0-150) 


 


 





 


Cholesterol Level


 157 mg/dL


(0-200) 


 


 





 


LDL Cholesterol, Calculated


 101 mg/dL


(0-100) 


 


 





 


VLDL Cholesterol, Calculated


 26 mg/dL


(0-40) 


 


 





 


Non-HDL Cholesterol Calculated


 127 mg/dL


(0-129) 


 


 





 


HDL Cholesterol


 30 mg/dL


(40-60) 


 


 





 


Cholesterol/HDL Ratio 5.2    











Review of Systems


Review of Systems:


Denies shortness of breath. Denies headache.





Assessment and Plan


Assessmemt and Plan


Problems


Medical Problems:


(1) Chest pain


Status: Acute  





(2) NSTEMI (non-ST elevation myocardial infarction)


Status: Acute  





(3) UTI (urinary tract infection)


Status: Acute





Chest pain, elevated troponin, N STEMI, UTI





Plan 


1 Cardiac monitoring


2 Await GI input


3 PPIs


4 Await cardiology input


5 Serial enzymes


6 Serial EKGs


7 Await echo  








Comment


Review of Relevant


I have reviewed the following items ronda (where applicable) has been applied.


Medications:





Current Medications








 Medications


  (Trade)  Dose


 Ordered  Sig/Reji


 Route


 PRN Reason  Start Time


 Stop Time Status Last Admin


Dose Admin


 


 Multi-Ingredient


 Mouthwash/Gargle


  (Gi Cocktail)  20 ml  1X  ONCE


 SWSW


   1/11/21 11:45


 1/11/21 11:46 DC 1/11/21 12:13





 


 Sodium Chloride  1,000 ml @ 


 75 mls/hr  [id number]


 IV


   1/11/21 13:45


    1/12/21 00:45





 


 Acetaminophen


  (Tylenol)  650 mg  PRN Q4HRS  PRN


 PO


 TEMP OVER 100.4F OR MILD PAIN  1/11/21 13:45


    1/12/21 08:35





 


 Lorazepam


  (Ativan Inj)  2 mg  PRN Q4HRS  PRN


 IV


 ANXIETY / AGITATION  1/11/21 13:45


    1/12/21 09:18





 


 Enoxaparin Sodium


  (Lovenox 40mg


 Syringe)  40 mg  Q24H


 SQ


   1/11/21 14:00


    1/11/21 14:31





 


 Aspirin


  (Ecotrin)  325 mg  DAILYWBKFT


 PO


   1/11/21 13:45


    1/12/21 08:35





 


 Pantoprazole


 Sodium


  (PROTONIX VIAL


 for IV PUSH)  40 mg  DAILY08


 IVP


   1/11/21 13:45


    1/12/21 08:36





 


 Ceftriaxone Sodium


  (Rocephin)  1 gm  1X  ONCE


 IVP


   1/11/21 14:15


 1/11/21 14:16 DC 1/11/21 14:31





 


 Ceftriaxone Sodium


  (Rocephin)  1 gm  DAILY08


 IVP


   1/12/21 08:00


    1/12/21 08:00














Justifications for Admission


General Conditions


Poss tachycardia?:  Yes


Justification for admission:  


Patient has  tachycardia (> 100 beats per minute) which is not readily 


corrected by appropriate treatment within 12 to 24 hours.











unstable angina





Other Justification














VLADIMIR VALENTE III DO           Jan 12, 2021 10:29

## 2021-01-12 NOTE — PDOC4
PROCEDURE


Procedure


EGD





Indication: N, V, epigastric pain, odynophagia





Meds: per anesthesia





Findings:





E--Normal


G--S/p natalie-en-Y.   Pouch otherwise normal.


D--Natalie limb normal to extend of scope.





Rex. well.





IMP: S/p Natalie-en-Y, otherwise normal exam w/o explanation for pain.





REC: Continue as now.


         Allow clears and observe.











MERCED TRAN MD          Jan 12, 2021 15:15

## 2021-01-12 NOTE — PDOC2
KULWINDER HARMON APRN 1/12/21 0954:


CARDIAC CONSULT


DATE OF CONSULT


Date of Consult


DATE: 1/12/21 


TIME: 09:44





REASON FOR CONSULT


Reason for Consult:


Elevated troponin





REFERRING PHYSICIAN


Referring Physician:


Fullbright





SOURCE


Source:  Chart review, Patient





HISTORY OF PRESENT ILLNESS


HISTORY OF PRESENT ILLNESS


This is 34 yo  female admitted for complains of abdominal pain. Reports 

that she has not been feeling good. She had some Rum over the weekend and has 

some nausea and vomiting. Since then she has been having issues with abdominal 

pain particularly her epigastric region. also it hurts as food goes down in her 

esophagus. Denies any chest tightness. No significant SOA. She does not take any

routine medications except for supplements such as biotin. No passing out or 

palpitations. She did have some vomiting Saturday but no significant retching or

intractable vomiting. As an inpt she has been having fever and noted with UTI.  

No hx of CAD, VTE or arrhythmias. She told me hx of murmur.





PAST MEDICAL HISTORY


Cardiovascular:  Other (murmur)


Pulmonary:  No pertinent hx


CENTRAL NERVOUS SYSTEM:  Other (No pertinent history)


GI:  GERD, Other (ischemic bowel post kayli en Y)


Heme/Onc:  Anemia NOS (blood transfusion in the past)


Hepatobiliary:  No pertinent hx


Psych:  No pertinent hx


Musculoskeletal:  Osteoarthritis


Infectious disease:  No pertinent hx


ENT:  No pertinent hx


Renal/:  No pertinent hx


Endocrine:  No pertinent hx


Dermatology:  Other (cutaneous jewelry to lip)





PAST SURGICAL HISTORY


Past Surgical History:  Appendectomy, Cholecystectomy, Other (bowel resection, 

Kayli en Y 2011)





FAMILY HISTORY


Family History


noncontributory





SOCIAL HISTORY


Smoke:  No


ALCOHOL:  rare


Drugs:  None


Lives:  with Family





CURRENT MEDICATIONS


CURRENT MEDICATIONS





Current Medications








 Medications


  (Trade)  Dose


 Ordered  Sig/Reji


 Route


 PRN Reason  Start Time


 Stop Time Status Last Admin


Dose Admin


 


 Multi-Ingredient


 Mouthwash/Gargle


  (Gi Cocktail)  20 ml  1X  ONCE


 SWSW


   1/11/21 11:45


 1/11/21 11:46 DC 1/11/21 12:13





 


 Sodium Chloride  1,000 ml @ 


 75 mls/hr  [id number]


 IV


   1/11/21 13:45


    1/12/21 00:45





 


 Acetaminophen


  (Tylenol)  650 mg  PRN Q4HRS  PRN


 PO


 TEMP OVER 100.4F OR MILD PAIN  1/11/21 13:45


    1/12/21 08:35





 


 Lorazepam


  (Ativan Inj)  2 mg  PRN Q4HRS  PRN


 IV


 ANXIETY / AGITATION  1/11/21 13:45


    1/12/21 09:18





 


 Enoxaparin Sodium


  (Lovenox 40mg


 Syringe)  40 mg  Q24H


 SQ


   1/11/21 14:00


    1/11/21 14:31





 


 Aspirin


  (Ecotrin)  325 mg  DAILYWBKFT


 PO


   1/11/21 13:45


    1/12/21 08:35





 


 Pantoprazole


 Sodium


  (PROTONIX VIAL


 for IV PUSH)  40 mg  DAILY08


 IVP


   1/11/21 13:45


    1/12/21 08:36





 


 Ceftriaxone Sodium


  (Rocephin)  1 gm  1X  ONCE


 IVP


   1/11/21 14:15


 1/11/21 14:16 DC 1/11/21 14:31














ALLERGIES


ALLERGIES:  


Coded Allergies:  


     No Known Drug Allergies (Unverified , 1/12/21)





ROS


Review of System


14 point ROS evaluated with pertinent positives noted per HPI





PHYSICAL EXAM


General:  Alert, Oriented X3, Cooperative, No acute distress, Other (appears 

tired)


HEENT:  Atraumatic, Mucous membr. moist/pink


Lungs:  Clear to auscultation, Normal air movement


Heart:  Regular rate (sinus tachycardia), Normal S1, Normal S2, No murmurs


Abdomen:  Other (epigastric tenderness)


Skin:  No breakdown, No significant lesion


Neuro:  Normal speech, Sensation intact


Psych/Mental Status:  Mental status NL, Mood NL


MUSCULOSKELETAL:  Osteoarthritic changes both hands





VITALS/I&O


VITALS/I&O:





                                   Vital Signs








  Date Time  Temp Pulse Resp B/P (MAP) Pulse Ox O2 Delivery O2 Flow Rate FiO2


 


1/12/21 09:05  113  102/60 (74)    


 


1/12/21 07:00 99.7  24  100 Room Air  





 99.7       














                                    I & O   


 


 1/11/21 1/11/21 1/12/21





 15:00 23:00 07:00


 


Intake Total   0 ml


 


Output Total  150 ml 


 


Balance  -150 ml 0 ml











LABS


Lab:





                                Laboratory Tests








Test


 1/11/21


11:09 1/11/21


11:14 1/11/21


12:10 1/11/21


14:51


 


Urine Collection Type Unknown     


 


Urine Color Klarissa     


 


Urine Clarity Hazy     


 


Urine pH


 6.0 (<5.0-8.0)


 


 


 





 


Urine Specific Gravity


 >=1.030


(1.000-1.030) 


 


 





 


Urine Protein


 100 mg/dL


(NEG-TRACE) 


 


 





 


Urine Glucose (UA)


 Negative mg/dL


(NEG) 


 


 





 


Urine Ketones (Stick)


 15 mg/dL (NEG)


 


 


 





 


Urine Blood Large (NEG)     


 


Urine Nitrite


 Positive (NEG)


 


 


 





 


Urine Bilirubin Small (NEG)     


 


Urine Urobilinogen Dipstick


 1.0 mg/dL (0.2


mg/dL) 


 


 





 


Urine Leukocyte Esterase Trace (NEG)     


 


Urine RBC


 6-10 /HPF


(0-2) 


 


 





 


Urine WBC


 5-10 /HPF


(0-4) 


 


 





 


Urine Squamous Epithelial


Cells Mod /LPF  


 


 


 





 


Urine Bacteria


 Moderate /HPF


(0-FEW) 


 


 





 


Urine Mucus Mod /LPF     


 


Urine Opiates Screen Neg (NEG)     


 


Urine Methadone Screen Neg (NEG)     


 


Urine Barbiturates Neg (NEG)     


 


Urine Phencyclidine Screen Neg (NEG)     


 


Urine


Amphetamine/Methamphetamine Neg (NEG)  


 


 


 





 


Urine Benzodiazepines Screen Neg (NEG)     


 


Urine Cocaine Screen Neg (NEG)     


 


Urine Cannabinoids Screen Neg (NEG)     


 


Urine Ethyl Alcohol Neg (NEG)     


 


POC Urine HCG, Qualitative


 


 Hcg negative


(Negative) 


 





 


White Blood Count


 


 


 13.0 x10^3/uL


(4.0-11.0)  H 





 


Red Blood Count


 


 


 4.80 x10^6/uL


(3.50-5.40) 





 


Hemoglobin


 


 


 10.3 g/dL


(12.0-15.5)  L 





 


Hematocrit


 


 


 33.7 %


(36.0-47.0)  L 





 


Mean Corpuscular Volume


 


 


 70 fL ()


L 





 


Mean Corpuscular Hemoglobin


 


 


 22 pg (25-35)


L 





 


Mean Corpuscular Hemoglobin


Concent 


 


 31 g/dL


(31-37) 





 


Red Cell Distribution Width


 


 


 19.8 %


(11.5-14.5)  H 





 


Platelet Count


 


 


 273 x10^3/uL


(140-400) 





 


Neutrophils (%) (Auto)   87 % (31-73)  H 


 


Lymphocytes (%) (Auto)   8 % (24-48)  L 


 


Monocytes (%) (Auto)   4 % (0-9)   


 


Eosinophils (%) (Auto)   1 % (0-3)   


 


Basophils (%) (Auto)   0 % (0-3)   


 


Neutrophils # (Auto)


 


 


 11.3 x10^3/uL


(1.8-7.7)  H 





 


Lymphocytes # (Auto)


 


 


 1.0 x10^3/uL


(1.0-4.8) 





 


Monocytes # (Auto)


 


 


 0.6 x10^3/uL


(0.0-1.1) 





 


Eosinophils # (Auto)


 


 


 0.1 x10^3/uL


(0.0-0.7) 





 


Basophils # (Auto)


 


 


 0.0 x10^3/uL


(0.0-0.2) 





 


Segmented Neutrophils %   83 % (35-66)  H 


 


Band Neutrophils %   7 % (0-9)   


 


Lymphocytes %   9 % (24-48)  L 


 


Monocytes %   1 % (0-10)   


 


Platelet Estimate


 


 


 Adequate


(ADEQUATE) 





 


Polychromasia   Present   


 


Hypochromasia   Present   


 


Anisocytosis   Present   


 


Microcytosis   Present   


 


Sodium Level


 


 


 140 mmol/L


(136-145) 





 


Potassium Level


 


 


 3.5 mmol/L


(3.5-5.1) 





 


Chloride Level


 


 


 102 mmol/L


() 





 


Carbon Dioxide Level


 


 


 28 mmol/L


(21-32) 





 


Anion Gap   10 (6-14)   


 


Blood Urea Nitrogen


 


 


 10 mg/dL


(7-20) 





 


Creatinine


 


 


 0.9 mg/dL


(0.6-1.0) 





 


Estimated GFR


(Cockcroft-Gault) 


 


 70.8  


 





 


Glucose Level


 


 


 97 mg/dL


(70-99) 





 


Calcium Level


 


 


 8.8 mg/dL


(8.5-10.1) 





 


Total Bilirubin


 


 


 0.2 mg/dL


(0.2-1.0) 





 


Direct Bilirubin


 


 


 0.1 mg/dL


(0.0-0.2) 





 


Aspartate Amino Transferase


(AST) 


 


 25 U/L (15-37)


 





 


Alanine Aminotransferase (ALT)


 


 


 30 U/L (14-59)


 





 


Alkaline Phosphatase


 


 


 88 U/L


() 





 


Creatine Kinase


 


 


 73 U/L


() 





 


Troponin I Quantitative


 


 


 0.138 ng/mL


(0.000-0.055) 0.159 ng/mL


(0.000-0.055)


 


Total Protein


 


 


 7.6 g/dL


(6.4-8.2) 





 


Albumin


 


 


 2.9 g/dL


(3.4-5.0)  L 





 


Lipase


 


 


 72 U/L


()  L 





 


Test


 1/12/21


07:20 


 


 





 


White Blood Count


 9.8 x10^3/uL


(4.0-11.0) 


 


 





 


Red Blood Count


 4.45 x10^6/uL


(3.50-5.40) 


 


 





 


Hemoglobin


 9.8 g/dL


(12.0-15.5)  L 


 


 





 


Hematocrit


 31.2 %


(36.0-47.0)  L 


 


 





 


Mean Corpuscular Volume


 70 fL ()


L 


 


 





 


Mean Corpuscular Hemoglobin


 22 pg (25-35)


L 


 


 





 


Mean Corpuscular Hemoglobin


Concent 32 g/dL


(31-37) 


 


 





 


Red Cell Distribution Width


 19.8 %


(11.5-14.5)  H 


 


 





 


Platelet Count


 252 x10^3/uL


(140-400) 


 


 





 


Neutrophils (%) (Auto) 79 % (31-73)  H   


 


Lymphocytes (%) (Auto) 14 % (24-48)  L   


 


Monocytes (%) (Auto) 4 % (0-9)     


 


Eosinophils (%) (Auto) 2 % (0-3)     


 


Basophils (%) (Auto) 1 % (0-3)     


 


Neutrophils # (Auto)


 7.8 x10^3/uL


(1.8-7.7)  H 


 


 





 


Lymphocytes # (Auto)


 1.4 x10^3/uL


(1.0-4.8) 


 


 





 


Monocytes # (Auto)


 0.4 x10^3/uL


(0.0-1.1) 


 


 





 


Eosinophils # (Auto)


 0.2 x10^3/uL


(0.0-0.7) 


 


 





 


Basophils # (Auto)


 0.1 x10^3/uL


(0.0-0.2) 


 


 





 


Troponin I Quantitative


 0.058 ng/mL


(0.000-0.055) 


 


 





 


Triglycerides Level


 128 mg/dL


(0-150) 


 


 





 


Cholesterol Level


 157 mg/dL


(0-200) 


 


 





 


LDL Cholesterol, Calculated


 101 mg/dL


(0-100)  H 


 


 





 


VLDL Cholesterol, Calculated


 26 mg/dL


(0-40) 


 


 





 


Non-HDL Cholesterol Calculated


 127 mg/dL


(0-129) 


 


 





 


HDL Cholesterol


 30 mg/dL


(40-60)  L 


 


 





 


Cholesterol/HDL Ratio 5.2     





                                Laboratory Tests


1/11/21 12:10








1/12/21 07:20








                                Laboratory Tests


1/11/21 12:10











ASSESSMENT/PLAN


ASSESSMENT/PLAN


1. UTI/fever: T max 102.1. CT abd reviewed, no pyelo features


2. Abdominal pain/vomiting with hx of kayli en y


3. Reactive sinus tachycardia


4. Elevated troponin: peaked at 0.15, no acute EKG changes. suspect demand 

mediated


5. Microcytic hypochromic anemia


6. DLP





Recommendations


1. Will obtain TTE. EGD planned today


2. IVF, Antibiotics ongoing, Appears no BC was obtained will defer to PCP


3. Supportive care





GENE GERARDO MD 1/12/21 1752:


CARDIAC CONSULT


ASSESSMENT/PLAN


ASSESSMENT/PLAN


Patient seen and examined.  Agree with NP's assessment and plan.


Sinus tachycardia physiologic secondary to SIRS


Slight trop elevation prob demand ischemia


Check 2D echo to assess LV function and rule out WMA


Continue antibiotics for UTI.


GI planning EGD today.


Thank you for your consultation.











KULWINDER HARMON          Jan 12, 2021 09:54


GENE GERARDO MD           Jan 12, 2021 17:52

## 2021-01-12 NOTE — EKG
[facility]

              [address] [place] [address]

Test Date:    2021               Test Time:    11:09:33

Pat Name:     FRANCISCO CASTELLANOS           Department:   

Patient ID:   [id number]           Room:          

Gender:       F                        Technician:   

:          [id number]               Requested By: LOLI MCGHEE

Order Number: [id number]           Reading MD:     

                                 Measurements

Intervals                              Axis          

Rate:         108                      P:            59

MN:           124                      QRS:          62

QRSD:         86                       T:            40

QT:           318                                    

QTc:          430                                    

                           Interpretive Statements

SINUS TACHYCARDIA

LEFT ATRIAL ABNORMALITY

INCOMPLETE RIGHT BUNDLE BRANCH BLOCK

ABNORMAL ECG

RI6.02

No previous ECG available for comparison

## 2021-01-12 NOTE — CARD
MR#: [id number]

Account#: [id number]

Accession#: [id number]

Date of Study: 01/12/2021

Ordering Physician: KULWINDER HARMON, 

Referring Physician: KULWINDER HARMON, 

Tech: Rossana Bajwa





--------------- APPROVED REPORT --------------





EXAM: Two-dimensional and M-mode echocardiogram with Doppler and color Doppler.



Other Information 

Quality : AverageHR: 103bpm



INDICATION

Elevated Troponin



2D DIMENSIONS 

RVDd2.6 (2.9-3.5cm)Left Atrium(2D)2.6 (1.6-4.0cm)

IVSd0.7 (0.7-1.1cm)Aortic Root(2D)2.6 (2.0-3.7cm)

LVDd4.6 (3.9-5.9cm)LVOT Diameter1.9 (1.8-2.4cm)

PWd1.0 (0.7-1.1cm)LVDs3.1 (2.5-4.0cm)

FS (%) 33.5 %SV61.8 ml

LVEF(%)62.2 (>50%)



Aortic Valve

AoV Peak Cliff.119.3cm/sAoV VTI20.7cm

AO Peak GR.5.7mmHgLVOT Peak Cliff.113.7cm/s

LVOT  VTI 18.43cmAO Mean GR.3mmHg

KERRIE (VMAX)2.63mu6CMQ   (VTI)2.62cm2



Mitral Valve

MV E Chmcqpjd78.8cm/sMV DECEL [id number]

MV A Wqgqnssf17.0cm/sMV SHB28il

E/A  Ratio1.7MVA (PHT)3.76cm2



TDI

E/Lateral E'9.0E/Medial E'9.5



Pulmonary Valve

PV Peak Imfjehni08.4cm/sPV Peak Grad.3mmHg



Tricuspid Valve

TR P. Gypaooqe182qw/sRAP OBKCZMUQ7eaJt

TR Peak Gr.00hgNoJCYQ57qbHd



Pulmonary Vein

S1 Rubsaveb53.0cm/sD2 Btpqyepw39.6cm/s

PVa bvusxbdp209xukv



 LEFT VENTRICLE 

The left ventricle is normal size. There is normal left ventricular wall thickness. The left ventricu
lar systolic function is normal and the ejection fraction is within normal range. The Ejection Fracti
on is 55-60%. There is normal LV segmental wall motion. The left ventricular diastolic function and f
illing is normal for age.



 RIGHT VENTRICLE 

The right ventricle is normal size. There is normal right ventricular wall thickness. The right ventr
icular systolic function is normal.



 ATRIA 

The left atrium size is normal. The right atrium size is normal. The interatrial septum is intact wit
h no evidence for an atrial septal defect or patent foramen ovale as noted on 2-D or Doppler imaging.




 AORTIC VALVE 

The aortic valve is normal in structure and function. Doppler and Color Flow revealed no significant 
aortic regurgitation. There is no significant aortic valvular stenosis.



 MITRAL VALVE 

The mitral valve is normal in structure and function. There is no evidence of mitral valve prolapse. 
There is no mitral valve stenosis.



 TRICUSPID VALVE 

The tricuspid valve is normal in structure and function. Doppler and Color Flow revealed trace tricus
pid regurgitation with an estimated PAP of 24 mmHg. There is no tricuspid valve stenosis.



 PULMONIC VALVE 

The pulmonic valve is not well visualized. Doppler and Color Flow revealed trace pulmonic valvular re
gurgitation. There is no pulmonic valvular stenosis.



 GREAT VESSELS 

The aortic root is normal in size. The IVC is normal in size and collapses >50% with inspiration.



 PERICARDIAL EFFUSION 

There is no evidence of significant pericardial effusion.



Critical Notification

Critical Value: No



<Conclusion>

The left ventricular systolic function is normal and the ejection fraction is within normal range. Th
e Ejection Fraction is 55-60%.

There is normal LV segmental wall motion.



Signed by : Sotero Kohler, 

Electronically Approved : 01/12/2021 15:33:32

## 2021-01-13 VITALS — DIASTOLIC BLOOD PRESSURE: 59 MMHG | SYSTOLIC BLOOD PRESSURE: 114 MMHG

## 2021-01-13 VITALS — SYSTOLIC BLOOD PRESSURE: 102 MMHG | DIASTOLIC BLOOD PRESSURE: 57 MMHG

## 2021-01-13 VITALS — DIASTOLIC BLOOD PRESSURE: 62 MMHG | SYSTOLIC BLOOD PRESSURE: 106 MMHG

## 2021-01-13 VITALS — DIASTOLIC BLOOD PRESSURE: 59 MMHG | SYSTOLIC BLOOD PRESSURE: 101 MMHG

## 2021-01-13 VITALS — SYSTOLIC BLOOD PRESSURE: 99 MMHG | DIASTOLIC BLOOD PRESSURE: 58 MMHG

## 2021-01-13 VITALS — DIASTOLIC BLOOD PRESSURE: 61 MMHG | SYSTOLIC BLOOD PRESSURE: 104 MMHG

## 2021-01-13 LAB
ANION GAP SERPL CALC-SCNC: 9 MMOL/L (ref 6–14)
BUN SERPL-MCNC: 9 MG/DL (ref 7–20)
CALCIUM SERPL-MCNC: 8.2 MG/DL (ref 8.5–10.1)
CHLORIDE SERPL-SCNC: 104 MMOL/L (ref 98–107)
CO2 SERPL-SCNC: 26 MMOL/L (ref 21–32)
CREAT SERPL-MCNC: 0.7 MG/DL (ref 0.6–1)
GFR SERPLBLD BASED ON 1.73 SQ M-ARVRAT: 94.7 ML/MIN
GLUCOSE SERPL-MCNC: 85 MG/DL (ref 70–99)
MAGNESIUM SERPL-MCNC: 1.8 MG/DL (ref 1.8–2.4)
POTASSIUM SERPL-SCNC: 3.3 MMOL/L (ref 3.5–5.1)
SODIUM SERPL-SCNC: 139 MMOL/L (ref 136–145)

## 2021-01-13 RX ADMIN — ENOXAPARIN SODIUM SCH MG: 40 INJECTION SUBCUTANEOUS at 14:28

## 2021-01-13 RX ADMIN — CYANOCOBALAMIN SCH MCG: 1000 INJECTION, SOLUTION INTRAMUSCULAR; SUBCUTANEOUS at 14:29

## 2021-01-13 RX ADMIN — Medication SCH CAP: at 20:17

## 2021-01-13 RX ADMIN — ASPIRIN SCH MG: 325 TABLET, DELAYED RELEASE ORAL at 09:27

## 2021-01-13 RX ADMIN — BACITRACIN SCH MLS/HR: 5000 INJECTION, POWDER, FOR SOLUTION INTRAMUSCULAR at 02:21

## 2021-01-13 RX ADMIN — BACITRACIN SCH MLS/HR: 5000 INJECTION, POWDER, FOR SOLUTION INTRAMUSCULAR at 19:48

## 2021-01-13 RX ADMIN — Medication SCH MG: at 17:00

## 2021-01-13 RX ADMIN — PANTOPRAZOLE SODIUM SCH MG: 40 TABLET, DELAYED RELEASE ORAL at 09:30

## 2021-01-13 RX ADMIN — ACETAMINOPHEN PRN MG: 325 TABLET, FILM COATED ORAL at 09:57

## 2021-01-13 RX ADMIN — CEFTRIAXONE SCH GM: 1 INJECTION, POWDER, FOR SOLUTION INTRAMUSCULAR; INTRAVENOUS at 09:27

## 2021-01-13 RX ADMIN — Medication SCH MG: at 14:27

## 2021-01-13 NOTE — PDOC
[facility] PROGRESS NOTE


Date of Service


DOS:


DATE: 1/13/21 


TIME: 08:21





Chief Complaint


Chief Complaint


Chest pain, elevated troponin, N STEMI, UTI





History of Present Illness


History of Present Illness


1/12: 


-Patient seen and examined. 


-Noble RN. Noble . 


-Chart reviewed.





1/13:


-Patient seen and examined.


-EGD negative


-Echo 55-60% EF


-Noble RN


-Chart reviewed





Vitals/I&O


Vitals/I&O:





                                   Vital Signs








  Date Time  Temp Pulse Resp B/P (MAP) Pulse Ox O2 Delivery O2 Flow Rate FiO2


 


1/13/21 07:00 99.4 102 18 101/59 (73) 97 Room Air  





 99.4       


 


1/12/21 15:14       3 














                                    I & O   


 


 1/12/21 1/12/21 1/13/21





 15:00 23:00 07:00


 


Intake Total  760 ml 100 ml


 


Balance  760 ml 100 ml











Physical Exam


General:  Alert, Oriented X3, Cooperative, No acute distress, Other (appears 

tired)


Heart:  Regular rate (sinus tachycardia), Normal S1, Normal S2, No murmurs


Abdomen:  Other (epigastric tenderness)


Extremities:  No clubbing, No cyanosis, Normal pulses


Skin:  No breakdown, No significant lesion





Labs


Labs:





Laboratory Tests








Test


 1/12/21


10:05 1/13/21


06:00


 


SARS-CoV-2 Antigen (Rapid)


 Negative


(NEGATIVE) 





 


Sodium Level


 


 139 mmol/L


(136-145)


 


Potassium Level


 


 3.3 mmol/L


(3.5-5.1)


 


Chloride Level


 


 104 mmol/L


()


 


Carbon Dioxide Level


 


 26 mmol/L


(21-32)


 


Anion Gap  9 (6-14) 


 


Blood Urea Nitrogen  9 mg/dL (7-20) 


 


Creatinine


 


 0.7 mg/dL


(0.6-1.0)


 


Estimated GFR


(Cockcroft-Gault) 


 94.7 





 


Glucose Level


 


 85 mg/dL


(70-99)


 


Calcium Level


 


 8.2 mg/dL


(8.5-10.1)


 


Magnesium Level


 


 1.8 mg/dL


(1.8-2.4)











Review of Systems


Review of Systems:


Denies SOB. Denies headache.





Assessment and Plan


Assessmemt and Plan


Problems


Medical Problems:


(1) Chest pain


Status: Acute  





(2) NSTEMI (non-ST elevation myocardial infarction)


Status: Acute  





(3) UTI (urinary tract infection)


Status: Acute





Chest pain, elevated troponin, N STEMI, UTI





Plan:


1. Continue rocephin


2. Cardiac monitoring


3. Serial enzymes


4. Serial EKGs


5. Await further cardiology input


6. DVT prophylaxis








Comment


Review of Relevant


I have reviewed the following items ronda (where applicable) has been applied.


Medications:





Current Medications








 Medications


  (Trade)  Dose


 Ordered  Sig/Reji


 Route


 PRN Reason  Start Time


 Stop Time Status Last Admin


Dose Admin


 


 Ringer's Solution  1,000 ml @ 


 75 mls/hr  1X  ONCE


 IV


   1/12/21 14:30


 1/13/21 03:49 DC 1/12/21 14:28














Justifications for Admission


General Conditions


Poss tachycardia?:  Yes


Justification for admission:  


Patient has  tachycardia (> 100 beats per minute) which is not readily 


corrected by appropriate treatment within 12 to 24 hours.











unstable angina





Other Justification














VLADIMIR VALENTE III DO           Jan 13, 2021 08:24

## 2021-01-13 NOTE — PDOC
KULWINDER HARMON APRN 1/13/21 1338:


CARDIO Progress Notes


Date and Time


Date of Service


1/13/2021


Time of Evaluation


1020





Subjective


Subjective:  No Chest Pain, No shortness of breath, No Palpitations





Vitals


Vitals





Vital Signs








  Date Time  Temp Pulse Resp B/P (MAP) Pulse Ox O2 Delivery O2 Flow Rate FiO2


 


1/13/21 11:00 98.8 101 20 114/59 (77) 97 Room Air  





 98.8       


 


1/12/21 15:14       3 








Weight


Weight [ ]





Input and Output


Intake and Output











Intake and Output 


 


 1/13/21





 07:00


 


Intake Total 860 ml


 


Balance 860 ml


 


 


 


Intake Oral 660 ml


 


IV Total 200 ml











Laboratory


Labs





Laboratory Tests








Test


 1/13/21


06:00


 


Sodium Level


 139 mmol/L


(136-145)


 


Potassium Level


 3.3 mmol/L


(3.5-5.1)


 


Chloride Level


 104 mmol/L


()


 


Carbon Dioxide Level


 26 mmol/L


(21-32)


 


Anion Gap 9 (6-14) 


 


Blood Urea Nitrogen 9 mg/dL (7-20) 


 


Creatinine


 0.7 mg/dL


(0.6-1.0)


 


Estimated GFR


(Cockcroft-Gault) 94.7 





 


Glucose Level


 85 mg/dL


(70-99)


 


Calcium Level


 8.2 mg/dL


(8.5-10.1)


 


Magnesium Level


 1.8 mg/dL


(1.8-2.4)


 


Iron Level


 15 ug/dL


()


 


Total Iron Binding Capacity


 286 ug/dL


(250-450)


 


Iron Saturation 5 % (15-34) 


 


Vitamin B12 Level


 129 pg/mL


(247-911)











Microbiology


Micro





Microbiology


1/11/21 Urine Culture - Final, Complete


          


1/11/21 Antimicrobic Susceptibility - Final, Complete





Physical Exam


HEENT:  Neck Supple W Full Motion


Chest:  Symmetric


LUNGS:  Clear to Auscultation


Heart:  S1S2, RRR (SR)


Abdomen:  Soft N/T


Extremities:  No Edema, No Calf Tenderness


Neurology:  alert, oriented, follow commands





Assessment


Assessment


1. UTI/fever: T max 102.1, continues to have intermittent low grade temp. Covid 

neg


2. Abdominal pain/vomiting with hx of kayli en y: EGD revealed no acute issues


3. Reactive sinus tachycardia


4. Elevated troponin: peaked at 0.15, no acute EKG changes. suspect demand 

mediated. EF and WM nml


5. Microcytic hypochromic anemia


6. DLP


7. Nuchal rigidity: Meningitis?





Recommendations


1. Consult ID


2. IVF, Antibiotics ongoing


3. Supportive care





Justicifation of Admission Dx:


Justifications for Admission:


Justification of Admission Dx:  Yes





GENE GERARDO MD 1/14/21 0609:


CARDIO Progress Notes


Assessment


Assessment


Patient seen and examined 1/13/21.  Agree with NP's assessment and plan.


Sinus tachycardia physiologic secondary to SIRS


Slight trop elevation prob demand ischemia


2D echo showed normal LVF without WMA


EGD results noted


Continue antibiotics for UTI - ID consulted.











KULWINDER HARMON          Jan 13, 2021 13:38


GENE GERARDO MD           Jan 14, 2021 06:09

## 2021-01-13 NOTE — CONS
DATE OF CONSULTATION:  01/13/2021



REFERRING PHYSICIAN:  Dr. Bunch.



REASON FOR CONSULTATION:  Fever, headache, UTI, rule out meningitis.



HISTORY OF PRESENT ILLNESS:  A 35-year-old female who presented to the ER on

01/11/2021 with complaints of abdominal pain, vomiting, chest pain and shortness

of breath when she took a deep breath.  The patient had been drinking rum on the

previous night.  She took Tylenol without much relief.  The patient had history

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

months, underwent revision.  The patient had leukocytosis at 13.  Troponin was

high.  Echo was within normal limits.  UDS was negative.  UA showed large blood,

wbc's 5-10.  She had some dysuria which has now resolved.  SARS COVID was

negative.  Urine culture was positive for E. coli.  Dysuria has resolved.  The

patient underwent acute abdominal series, which showed air fluid level, which

may be indicative of enteritis.  No lung infiltrate or pulmonary edema or

pleural effusion, or pneumothorax.  CT abdomen and chest showed no acute

abnormality.  This morning, the patient started having complains of some neck

pain, headache whenever she had coughed.  She continues to have abdominal pain,

more in the epigastric area now going to her back, worse with breathing.  She is

able to take clear liquids only, any p.o. intake aggravates her pain.  T-max is

99.4.  Concern was for possible meningitis, so ID consultation has been

requested.  The patient underwent EGD, which was reported normal.



PAST MEDICAL HISTORY:  GERD, anemia, bowel resection, Natalie-en-Y in 2011,

appendectomy, cholecystectomy, anemia.



PAST SURGICAL HISTORY:  As above.



FAMILY HISTORY:  Noncontributory.



SOCIAL HISTORY:  No smoking.  Alcohol, rare.  Drugs:  None.  Lives alone.  No

pets.  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



CURRENT MEDICATIONS:  Ceftriaxone, ferrous sulfate, cyanocobalamin,

pantoprazole, enoxaparin, aspirin, lorazepam, guaifenesin, albuterol, docusate,

clonidine, acetaminophen, and Zofran.



ALLERGIES:  No known drug allergies.



REVIEW OF SYSTEMS:  Negative except for above in HPI.



PHYSICAL EXAMINATION:

VITAL SIGNS:  Temperature 99.4, pulse 102, respiratory rate 18, blood pressure

101/59, oxygen saturation 97% on room air, T-max 99.7.

GENERAL:  Alert, oriented x 3 female, lying in bed comfortably, appears tired,

in no acute distress.

HEENT:  Normocephalic, atraumatic, anicteric.  No thrush.  No photophobia, no

phonophobia.  Lip ring present.

NECK:  Supple, no JVD, no lymphadenopathy.

LUNGS:  Clear bilaterally.  No wheezing.

HEART:  S1, S2.  No gallops or murmurs.

ABDOMEN:  Mild tenderness present in the epigastric area.  Soft.  No rebound, no

guarding.  Previous surgery scar well healed.

EXTREMITIES:  No edema, no cyanosis.

DERMATOLOGIC:  Warm, dry.  No generalized rash.  Multiple tattoos.

NEUROLOGIC:  Alert and oriented x 3, grossly nonfocal.

PSYCHIATRIC:  Cooperative, appropriate mood and affect.

BACK:  Reveals normal curvature.  No CVA tenderness.



LABORATORY DATA:  WBC 9.8, was 13, hemoglobin 9.8, hematocrit 31.2, platelets

252.  Sodium 139, potassium 3.3, chloride 104, bicarbonate 26, BUN 9, creatinine

0.7, glucose 85.  Iron 15, iron saturation 5, total iron-binding capacity 286. 

Vitamin B12 of 129.  UA shows large blood, 5-10 wbc's.  UDS negative.  Rapid

SARS COVID negative.



MICROBIOLOGY:  Urine culture E. coli greater than 100,000.



IMAGING:  Acute abdominal series as above.



CT chest and abdomen as above.



IMPRESSION:

1.  Febrile illness, improving.

2.  Urinary tract infection, Escherichia coli, pansensitive.

3.  Abdominal pain, GI workup underway, nonrevealing at this time.

4.  Headache with neck discomfort, likely musculoskeletal strain.  No clinical 
evidence of meningitis

at this time.

5.  Vomiting, resolved, status post EGD.

6.  Status post Natalie-en-Y.

7.  Leukocytosis, likely from above, resolved.



RECOMMENDATIONS:

1.  Continue ceftriaxone for now.

2.  Monitor closely.  No need for LP at this time as clinical suspicion for

bacterial meningitis remains low.

3.  Continue supportive care.

4.  Maintain hydration.

5.  Follow up labs and cultures.

6.  Continue supportive care.



Thank you for allowing me to participate in this patient's care.  If you have

any questions, do not hesitate to contact me.

 



______________________________

FE MCGILL MD



DR:  ANNIKA/melinda  JOB#:  [id number] / [id number]

DD:  01/13/2021 11:37  DT:  01/13/2021 12:14

MANUEL

## 2021-01-13 NOTE — NUR
SS following up with discharge planning. SS reviewed pt chart and discussed with pt RN. Pt 
is currently on room air. Pt on IV Rocephin. COVID19 negative. EGD and ECHO negative. ID 
consulted for fever. Self pay. SS will continue to follow for discharge planning

## 2021-01-13 NOTE — PDOC
Date of Service:


DATE: 1/13/21 


TIME: 09:29





Subjective:


Subjective:


Taking some clear liquids.


Pain is better - no pain with swallowing/eating.  Pain is different - "on the 

outside" - later describes as "behind."  Worse w/ breathing.





Objective:


Vital Signs:





                                   Vital Signs








  Date Time  Temp Pulse Resp B/P (MAP) Pulse Ox O2 Delivery O2 Flow Rate FiO2


 


1/13/21 07:00 99.4 102 18 101/59 (73) 97 Room Air  





 99.4       


 


1/12/21 15:14       3 








Labs:





Laboratory Tests








Test


 1/12/21


10:05 1/13/21


06:00


 


SARS-CoV-2 Antigen (Rapid) Negative  


 


Sodium Level  139 mmol/L 


 


Potassium Level  3.3 mmol/L 


 


Chloride Level  104 mmol/L 


 


Carbon Dioxide Level  26 mmol/L 


 


Anion Gap  9 


 


Blood Urea Nitrogen  9 mg/dL 


 


Creatinine  0.7 mg/dL 


 


Estimated GFR


(Cockcroft-Gault) 


 94.7 





 


Glucose Level  85 mg/dL 


 


Calcium Level  8.2 mg/dL 


 


Magnesium Level  1.8 mg/dL 








  URINE CULTURE  Final  


        Final





        GREATER THAN 100,000 CFU/ML GRAM NEGATIVE RODS


        FINAL ID= [ESCHERICHIA COLI]


Imaging:


Echo 1/12


<Conclusion>


The left ventricular systolic function is normal and the ejection fraction is 

within normal range. The Ejection Fraction is 55-60%.


There is normal LV segmental wall motion.





EGD 1/12


Indication: N, V, epigastric pain, odynophagia


E--Normal


G--S/p natalie-en-Y.   Pouch otherwise normal.


D--Natalie limb normal to extend of scope.


IMP: S/p Natalie-en-Y, otherwise normal exam w/o explanation for pain.


REC: Continue as now.


         Allow clears and observe.





PE:





GEN: NAD - looks some better


LUNGS: CTAB


HEART: mildly tachycardic


ABD: less epigastric discomfort, soft


NEURO/PSYCH: A & O 3





A/P:


Epigastric pain - better


UTI


Fever, tachycardia - better


Microcytic anemia, hypokalemia


S/p Natalie-en-Y, SB resection


S/p cholecystectomy





--


Okay to change to PO PPI - d/w nurse Nish FRAGA.


No records from [place].


Check anemia parameters for completeness - she has been off of iron and B12.





Justicifation of Admission Dx:


Justifications for Admission:


Justification of Admission Dx:  Yes











ROMELIA CUEVA         Jan 13, 2021 09:34

## 2021-01-14 VITALS — SYSTOLIC BLOOD PRESSURE: 108 MMHG | DIASTOLIC BLOOD PRESSURE: 54 MMHG

## 2021-01-14 VITALS — SYSTOLIC BLOOD PRESSURE: 101 MMHG | DIASTOLIC BLOOD PRESSURE: 51 MMHG

## 2021-01-14 VITALS — SYSTOLIC BLOOD PRESSURE: 93 MMHG | DIASTOLIC BLOOD PRESSURE: 53 MMHG

## 2021-01-14 RX ADMIN — BACITRACIN SCH MLS/HR: 5000 INJECTION, POWDER, FOR SOLUTION INTRAMUSCULAR at 09:19

## 2021-01-14 RX ADMIN — Medication SCH MG: at 08:53

## 2021-01-14 RX ADMIN — ASPIRIN SCH MG: 325 TABLET, DELAYED RELEASE ORAL at 08:53

## 2021-01-14 RX ADMIN — CYANOCOBALAMIN SCH MCG: 1000 INJECTION, SOLUTION INTRAMUSCULAR; SUBCUTANEOUS at 08:52

## 2021-01-14 RX ADMIN — PANTOPRAZOLE SODIUM SCH MG: 40 TABLET, DELAYED RELEASE ORAL at 08:53

## 2021-01-14 RX ADMIN — Medication SCH CAP: at 08:53

## 2021-01-14 RX ADMIN — CEFTRIAXONE SCH GM: 1 INJECTION, POWDER, FOR SOLUTION INTRAMUSCULAR; INTRAVENOUS at 08:00

## 2021-01-14 NOTE — NUR
SS following up with discharge planning. SS reviewed pt chart and discussed with pt RN. Pt 
is currently on room air. COVID19 negative. Pt on PO antibiotics. Discharge plan is to home 
when medically ready. SS will continue to follow for discharge planning.

## 2021-01-14 NOTE — NUR
Discharge Note:



FRANCISCO CASTELLANOS



Discharge instructions and discharge home medications reviewed with Patient and a copy 
given. All questions have been answered and understanding verbalized. 



The following instructions and handouts were given: Chest pain, abdominal pain



Patient discharged to home with self care via private vehicle and sister

## 2021-01-14 NOTE — PDOC
[facility] PROGRESS NOTE


Date of Service


DOS:


DATE: 1/14/21 


TIME: 10:41





Chief Complaint


Chief Complaint


Chest pain, elevated troponin, N STEMI, UTI





History of Present Illness


History of Present Illness


1/12: 


-Patient seen and examined. 


-Noble RN. Noble . 


-Chart reviewed.





1/13:


-Patient seen and examined.


-EGD negative


-Echo 55-60% EF


-Noble RN


-Chart reviewed





1/14:


-Patient seen and examined


-Noble RN. Noble .


-Low probability of meningitis.


-Low grade fever has improved


-B12 low. 


-Patient discussed history of depression, previous SI, poor living situation


-Chart reviewed.





Vitals/I&O


Vitals/I&O:





                                   Vital Signs








  Date Time  Temp Pulse Resp B/P (MAP) Pulse Ox O2 Delivery O2 Flow Rate FiO2


 


1/14/21 08:00      Room Air  


 


1/14/21 07:33     99   


 


1/14/21 07:00 97.9 90 16 108/54 (72)    





 97.9       














                                    I & O   


 


 1/13/21 1/13/21 1/14/21





 15:00 23:00 07:00


 


Intake Total 720 ml 1480 ml 580 ml


 


Output Total 400 ml  200 ml


 


Balance 320 ml 1480 ml 380 ml











Physical Exam


Physical Exam:


GENERAL:  Alert, oriented x 3 female, lying in bed comfortably, appears tired,


in no acute distress. 


HEENT:  Normocephalic, atraumatic, anicteric.  No thrush.  No photophobia, no


phonophobia.  Lip ring present.


NECK:  Supple, no JVD, no lymphadenopathy.


LUNGS:  Clear bilaterally.  No wheezing.


HEART:  S1, S2.  No gallops or murmurs.


ABDOMEN:  Mild tenderness present in the epigastric area.  Improved soft.  No 

rebound, no


guarding.  Previous surgery scar well healed.


EXTREMITIES:  No edema, no cyanosis.


DERMATOLOGIC:  Warm, dry.  No generalized rash.  Multiple tattoos.


NEUROLOGIC:  Alert and oriented x 3, grossly nonfocal.


PSYCHIATRIC:  Cooperative, appropriate mood and affect.


BACK:  Reveals normal curvature.  No CVA tenderness.


General:  Alert, Oriented X3, Cooperative, No acute distress, Other (appears 

tired)


Heart:  Regular rate (sinus tachycardia), Normal S1, Normal S2, No murmurs


Abdomen:  Other (epigastric tenderness)


Extremities:  No clubbing, No cyanosis, Normal pulses


Skin:  No breakdown, No significant lesion





Review of Systems


Review of Systems:


Denies SOB. Denies abdominal pain.





Assessment and Plan


Assessmemt and Plan


Problems


Medical Problems:


(1) Chest pain


Status: Acute  





(2) NSTEMI (non-ST elevation myocardial infarction)


Status: Acute  





(3) UTI (urinary tract infection)


Status: Acute  





Chest pain, elevated troponin, N STEMI, UTI





Plan


1. Cardiac monitoring


2. Replace B12


3. Home meds


4. DVT prophylaxis


5. Full code


6. Gave patient Prozac


7. Outpatient abx


8. Probable d/c today








Comment


Review of Relevant


I have reviewed the following items ronda (where applicable) has been applied.


Medications:





Current Medications








 Medications


  (Trade)  Dose


 Ordered  Sig/Reji


 Route


 PRN Reason  Start Time


 Stop Time Status Last Admin


Dose Admin


 


 Cyanocobalamin


  (Vitamin B-12)  1,000 mcg  DAILY


 IM


   1/13/21 11:30


    1/14/21 08:52





 


 Ferrous Sulfate


  (Feosol)  325 mg  BIDWMEALS


 PO


   1/13/21 12:00


    1/14/21 08:53





 


 Potassium Chloride


  (Klor-Con)  40 meq  1X  ONCE


 PO


   1/13/21 14:00


 1/13/21 14:01 DC 1/13/21 14:27





 


 Lactobacillus


 Rhamnosus


  (Culturelle)  1 cap  BID


 PO


   1/13/21 21:00


    1/14/21 08:53














Justifications for Admission


General Conditions


Poss tachycardia?:  Yes


Justification for admission:  


Patient has  tachycardia (> 100 beats per minute) which is not readily 


corrected by appropriate treatment within 12 to 24 hours.











unstable angina





Other Justification














VLADIMIR VALENTE III DO           Jan 14, 2021 10:47

## 2021-01-14 NOTE — PDOC
Infectious Disease Note


Subjective:


Subjective


Patient feels much better today


Headache and neck pain have improved


Abdominal pain, nausea is improved with p.o. intake





Vital Signs:


Vital Signs





Vital Signs








  Date Time  Temp Pulse Resp B/P (MAP) Pulse Ox O2 Delivery O2 Flow Rate FiO2


 


1/14/21 07:33     99 Room Air  


 


1/14/21 03:35 98.0 87 18 93/53 (66)    





 98.0       











Physical Exam:


PHYSICAL EXAM


GENERAL:  Alert, oriented x 3 female, lying in bed comfortably, appears tired,


in no acute distress. 


HEENT:  Normocephalic, atraumatic, anicteric.  No thrush.  No photophobia, no


phonophobia.  Lip ring present.


NECK:  Supple, no JVD, no lymphadenopathy.


LUNGS:  Clear bilaterally.  No wheezing.


HEART:  S1, S2.  No gallops or murmurs.


ABDOMEN:  Mild tenderness present in the epigastric area.  Improved soft.  No 

rebound, no


guarding.  Previous surgery scar well healed.


EXTREMITIES:  No edema, no cyanosis.


DERMATOLOGIC:  Warm, dry.  No generalized rash.  Multiple tattoos.


NEUROLOGIC:  Alert and oriented x 3, grossly nonfocal.


PSYCHIATRIC:  Cooperative, appropriate mood and affect.


BACK:  Reveals normal curvature.  No CVA tenderness.





Medications:


Inpatient Meds:





Current Medications








 Medications


  (Trade)  Dose


 Ordered  Sig/Reji  Start Time


 Stop Time Status Last Admin


Dose Admin


 


 Acetaminophen


  (Tylenol)  650 mg  PRN Q4HRS  PRN  1/11/21 13:45


    1/13/21 09:57


650 MG


 


 Al Hydroxide/Mg


 Hydroxide


  (Mylanta Plus Xs)  30 ml  PRN DAILY  PRN  1/11/21 13:45


     





 


 Albuterol Sulfate


  (Ventolin Neb


 Soln)  2.5 mg  PRN Q4HRS  PRN  1/11/21 13:45


     





 


 Aspirin


  (Fiona Aspirin)  325 mg  STK-MED ONCE  1/11/21 13:38


 1/11/21 13:39 DC  





 


 Aspirin


  (Ecotrin)  325 mg  DAILYWBKFT  1/11/21 13:45


    1/13/21 09:27


325 MG


 


 Ceftriaxone Sodium


  (Rocephin)  1 gm  DAILY08  1/12/21 08:00


    1/13/21 09:27


1 GM


 


 Clonidine HCl


  (Catapres)  0.1 mg  PRN Q6HRS  PRN  1/11/21 13:45


     





 


 Cyanocobalamin


  (Vitamin B-12)  1,000 mcg  DAILY  1/13/21 11:30


    1/13/21 14:29


1,000 MCG


 


 Dextrose


  (Dextrose


 50%-Water Syringe)  12.5 gm  PRN Q15MIN  PRN  1/12/21 11:30


   Cancel  





 


 Docusate Sodium


  (Colace)  100 mg  PRN BID  PRN  1/11/21 13:45


     





 


 Enoxaparin Sodium


  (Lovenox 40mg


 Syringe)  40 mg  Q24H  1/11/21 14:00


    1/13/21 14:28


40 MG


 


 Ferrous Sulfate


  (Feosol)  325 mg  BIDWMEALS  1/13/21 12:00


    1/13/21 14:27


325 MG


 


 Guaifenesin


  (Robitussin)  200 mg  PRN Q4HRS  PRN  1/11/21 13:45


     





 


 Insulin Human


 Lispro


  (HumaLOG)  0-9 UNITS  TIDWMEALS  1/12/21 12:00


   Cancel  





 


 Lactobacillus


 Rhamnosus


  (Culturelle)  1 cap  BID  1/13/21 21:00


    1/13/21 20:17


1 CAP


 


 Lidocaine HCl


  (Lidocaine Pf 2%


 Vial)  5 ml  STK-MED ONCE  1/12/21 15:20


 1/12/21 15:21 DC  





 


 Lorazepam


  (Ativan Inj)  2 mg  PRN Q4HRS  PRN  1/11/21 13:45


    1/13/21 20:21


2 MG


 


 Lorazepam


  (Ativan)  0.5 mg  PRN Q4HRS  PRN  1/11/21 13:45


    1/13/21 14:39


0.5 MG


 


 Multi-Ingredient


 Mouthwash/Gargle


  (Gi Cocktail)  20 ml  1X  ONCE  1/11/21 11:45


 1/11/21 11:46 DC 1/11/21 12:13


20 ML


 


 Ondansetron HCl


  (Zofran)  4 mg  PRN Q4HRS  PRN  1/11/21 13:45


     





 


 Pantoprazole


 Sodium


  (PROTONIX VIAL


 for IV PUSH)  40 mg  DAILY08  1/11/21 13:45


 1/13/21 08:56 DC 1/12/21 08:36


40 MG


 


 Pantoprazole


 Sodium


  (Protonix)  40 mg  DAILYAC  1/13/21 09:30


    1/13/21 09:30


40 MG


 


 Potassium Chloride


  (Klor-Con)  40 meq  1X  ONCE  1/13/21 14:00


 1/13/21 14:01 DC 1/13/21 14:27


40 MEQ


 


 Propofol


  (Diprivan)  200 mg  STK-MED ONCE  1/12/21 15:20


 1/12/21 15:21 DC  





 


 Ringer's Solution  1,000 ml @ 


 75 mls/hr  1X  ONCE  1/12/21 14:30


 1/13/21 03:49 DC 1/12/21 14:28


75 MLS/HR


 


 Sodium


 Monofluorophosphate


  (Fleet Adult)  133 ml  PRN DAILY  PRN  1/11/21 13:45


     





 


 Sodium Chloride  1,000 ml @ 


 75 mls/hr  [id number]  1/11/21 13:45


    1/13/21 19:48


75 MLS/HR


 


 Sodium Chloride


  (Normal Saline


 Flush)  3 ml  QSHIFT  PRN  1/11/21 13:45


     














Objective:


Assessment:





1.  Febrile illness, improved


2.  Urinary tract infection, Escherichia coli, pansensitive.


3.  Abdominal pain, GI workup underway, nonrevealing at this time.


4.  Headache with neck discomfort, likely musculoskeletal strain.  No clinical 

evidence of meningitis


at this time.


5.  Vomiting, resolved, status post EGD.


6.  Status post Natalie-en-Y.


7.  Leukocytosis, likely from above, resolved.





Plan:


Plan of Care


Change ceftriaxone to cefdinir


Clinical suspicion for meningitis remains low


Patient is clinically improving


Encouraged hydration


Continue supportive care.


 Follow up labs and cultures.











FE MCGILL MD           Jan 14, 2021 08:07

## 2021-01-14 NOTE — NUR
Patient states that she has a history of "prescription pill addiction", self harm (wrist 
cutting), and suicide attempts in the past. Pt states that she has not had suicidal thoughts 
in a long itme and does not have suicidal thoughts at this time. Will continue to monitor.

## 2021-01-14 NOTE — PDOC
Date of Service:


DATE: 1/14/21 


TIME: 11:56





Subjective:


Subjective:


Better, would like to go home.


Tolerating more regular diet - had some eggs this morning.


Pain is "dissipating."





Objective:


Vital Signs:





                                   Vital Signs








  Date Time  Temp Pulse Resp B/P (MAP) Pulse Ox O2 Delivery O2 Flow Rate FiO2


 


1/14/21 08:00      Room Air  


 


1/14/21 07:33     99   


 


1/14/21 07:00 97.9 90 16 108/54 (72)    





 97.9       











PE:





GEN: NAD


LUNGS: CTAB


HEART: RRR


ABD: mild epigastric discomfort - much better, soft


NEURO/PSYCH: A & O 3





A/P:


Epigastric pain - resolving - EGD unremarkable for acute issue


UTI


Anemia - iron and B12 deficient - resumed replacement therapy yesterday


S/p Natalie-en-Y, SB resection


S/p cholecystectomy


COVID negative 1/12





--


Dc per primary on iron, B12, PPI.


F/u w/ GI PRN.





Justicifation of Admission Dx:


Justifications for Admission:


Justification of Admission Dx:  Yes











ROMELIA CUEVA         Jan 14, 2021 11:59

## 2021-01-28 NOTE — DS
DATE OF DISCHARGE:  01/14/2021



ADMISSION DIAGNOSES:

1.  Chest pain.

2.  Urinary tract infection.



DISCHARGE DIAGNOSES:

1.  Atypical chest pain.

2.  Clinical depression.

3.  Resolving urinary tract infection.



HOSPITAL COURSE:  The patient is a pleasant middle-aged female who presented

with chest pain.  We did a full cardiac workup.  We consulted Cardiology.  Her

workup was negative.  On the day of discharge, she admitted that she was

extremely depressed that she had social situation at home with her , and

she were going through a divorce and things were does not good at home.  We

discharged the patient home.



DISPOSITION:  Home.



ACTIVITY:  As tolerated.



DIET:  Low sodium.



MEDICATIONS:  Please see the MRAD.



TOTAL TIME:  32 minutes.

 



______________________________

VLADIMIR VALENTE DO



DR:  EDVIN/melinda  JOB#:  [id number] / [id number]

DD:  01/28/2021 12:03  DT:  01/28/2021 14:16

## 2021-05-07 NOTE — PHYS DOC
Past Medical History


Past Medical History:  No Pertinent History


Past Surgical History:  Appendectomy, Cholecystectomy, , Other


Additional Past Surgical Histo:  gastric bypass for mesenteric clot;


Smoking Status:  Never Smoker


Alcohol Use:  Occasionally


Drug Use:  None





General Adult


EDM:


Chief Complaint:  ABDOMINAL PAIN





HPI:


HPI:





Patient is a 35  year old female presents emergency department reporting that 

last Friday she was drinking rum at night.  Woke up Saturday morning and noticed

she had epigastric pain.  Patient denies any nausea or vomiting.  Patient states

her epigastric pain increases when she eats or she drinks fluid.  Patient states

it also increases when she moves or takes a deep breath.  Patient states she 

took Tylenol 650 mg OTC p.o. this morning without relief of pain.  Patient 

denies shortness of breath chest congestion or chest pains.  Patient 

constipation or diarrhea.  Patient states her last normal BM was 5 days ago and 

this is normal for her.  Patient reports a past medical history of a mesenteric 

clot in  which resulted in a gastric bypass, states she wore a colostomy for

about 2 months, and no longer wears a colostomy as it was revised.  Patient 

states her only medication she takes is biotin p.o.  Patient does state a 

history of anemia which requires every other month blood transfusions and iron 

transfusions.  Patient states she had a gallbladder removal in  and her 

appendix removed in , her only other surgeries are 2 C-sections, patient has

2 children ages 12 and 16.  Patient denies any other physical symptoms or 

physical complaints.





Review of Systems:


Review of Systems:


14 body systems of review of systems have been reviewed.  See HPI for pertinent 

positives and negative responses, otherwise all other systems are negative, 

nonpertinent or noncontributory.





Heart Score:


HEART Score for Chest Pain:  








HEART Score for Chest Pain Response (Comments) Value


 


History Slighlty/Non-Suspicious 0


 


ECG Normal 0


 


Age < 45 0


 


Risk Factors                            1 or 2 Risk Factors 1


 


Troponin >3 x Normal Limit 2


 


Total  3








Risk Factors:


Risk Factors:  DM, Current or recent (<one month) smoker, HTN, HLP, family 

history of CAD, obesity.


Risk Scores:


Score 0 - 3:  2.5% MACE over next 6 weeks - Discharge Home


Score 4 - 6:  20.3% MACE over next 6 weeks - Admit for Clinical Observation


Score 7 - 10:  72.7% MACE over next 6 weeks - Early Invasive Strategies





Current Medications:


Patient reports taking p.o. biotin only.


Current Medications








 Medications


  (Trade)  Dose


 Ordered  Sig/Reji  Start Time


 Stop Time Status Last Admin


Dose Admin


 


 Multi-Ingredient


 Mouthwash/Gargle


  (Gi Cocktail)  20 ml  1X  ONCE  21 11:45


 21 11:46 DC  














Allergies:


Allergies:





Allergies








Coded Allergies Type Severity Reaction Last Updated Verified


 


  No Known Drug Allergies    21 No











Physical Exam:


PE:





Constitutional: Well developed, well nourished, no acute distress, non-toxic 

appearance. 


HENT: Normocephalic, atraumatic, bilateral external ears normal, oropharynx 

moist, no oral exudates, nose normal. 


Eyes: PERRLA, EOMI, conjunctiva normal, no discharge.  


Neck: Normal range of motion, no tenderness, supple, no stridor.  


Cardiovascular:Heart rate regular rhythm, no murmur, heart sounds S1-S2 to 

auscultation.


Lungs & Thorax:  Bilateral breath sounds clear to auscultation all lung fields.


Abdomen: Bowel sounds normal, soft, no tenderness, no masses, no pulsatile 

masses.  Pain to palpation over epigastric area.  Abdominal surgical scars well-

healed.


Skin: Warm, dry, no erythema, no rash.  


Back: No tenderness, no CVA tenderness.  


Extremities: No tenderness, no cyanosis, no clubbing, ROM intact, no edema.  


Neurologic: Alert and oriented X 3, normal motor function, normal sensory 

function, no focal deficits noted. 


Psychologic: Affect normal, judgement normal, mood normal.





Current Patient Data:


Labs:





Laboratory Tests








Test


 21


11:09 21


11:14 21


12:10


 


Urine Collection Type Unknown   


 


Urine Color Klarissa   


 


Urine Clarity Hazy   


 


Urine pH 6.0   


 


Urine Specific Gravity >=1.030   


 


Urine Protein 100 mg/dL   


 


Urine Glucose (UA) Negative mg/dL   


 


Urine Ketones (Stick) 15 mg/dL   


 


Urine Blood Large   


 


Urine Nitrite Positive   


 


Urine Bilirubin Small   


 


Urine Urobilinogen Dipstick 1.0 mg/dL   


 


Urine Leukocyte Esterase Trace   


 


Urine RBC 6-10 /HPF   


 


Urine WBC 5-10 /HPF   


 


Urine Squamous Epithelial


Cells Mod /LPF 


 


 





 


Urine Bacteria Moderate /HPF   


 


Urine Mucus Mod /LPF   


 


Urine Opiates Screen Neg   


 


Urine Methadone Screen Neg   


 


Urine Barbiturates Neg   


 


Urine Phencyclidine Screen Neg   


 


Urine


Amphetamine/Methamphetamine Neg 


 


 





 


Urine Benzodiazepines Screen Neg   


 


Urine Cocaine Screen Neg   


 


Urine Cannabinoids Screen Neg   


 


Urine Ethyl Alcohol Neg   


 


Bedside Urine HCG, Qualitative  Hcg negative  


 


White Blood Count   13.0 x10^3/uL 


 


Red Blood Count   4.80 x10^6/uL 


 


Hemoglobin   10.3 g/dL 


 


Hematocrit   33.7 % 


 


Mean Corpuscular Volume   70 fL 


 


Mean Corpuscular Hemoglobin   22 pg 


 


Mean Corpuscular Hemoglobin


Concent 


 


 31 g/dL 





 


Red Cell Distribution Width   19.8 % 


 


Platelet Count   273 x10^3/uL 


 


Neutrophils (%) (Auto)   87 % 


 


Lymphocytes (%) (Auto)   8 % 


 


Monocytes (%) (Auto)   4 % 


 


Eosinophils (%) (Auto)   1 % 


 


Basophils (%) (Auto)   0 % 


 


Neutrophils # (Auto)   11.3 x10^3/uL 


 


Lymphocytes # (Auto)   1.0 x10^3/uL 


 


Monocytes # (Auto)   0.6 x10^3/uL 


 


Eosinophils # (Auto)   0.1 x10^3/uL 


 


Basophils # (Auto)   0.0 x10^3/uL 


 


Platelet Estimate   Pending 


 


Sodium Level   140 mmol/L 


 


Potassium Level   3.5 mmol/L 


 


Chloride Level   102 mmol/L 


 


Carbon Dioxide Level   28 mmol/L 


 


Anion Gap   10 


 


Blood Urea Nitrogen   10 mg/dL 


 


Creatinine   0.9 mg/dL 


 


Estimated GFR


(Cockcroft-Gault) 


 


 70.8 





 


Glucose Level   97 mg/dL 


 


Calcium Level   8.8 mg/dL 


 


Total Bilirubin   0.2 mg/dL 


 


Direct Bilirubin   0.1 mg/dL 


 


Aspartate Amino Transf


(AST/SGOT) 


 


 25 U/L 





 


Alanine Aminotransferase


(ALT/SGPT) 


 


 30 U/L 





 


Alkaline Phosphatase   88 U/L 


 


Creatine Kinase   73 U/L 


 


Troponin I Quantitative   0.138 ng/mL 


 


Total Protein   7.6 g/dL 


 


Albumin   2.9 g/dL 


 


Lipase   72 U/L 








Current Medications








 Medications


  (Trade)  Dose


 Ordered  Sig/Reji


 Route


 PRN Reason  Start Time


 Stop Time Status Last Admin


Dose Admin


 


 Multi-Ingredient


 Mouthwash/Gargle


  (Gi Cocktail)  20 ml  1X  ONCE


 SWSW


   21 11:45


 21 11:46 DC 21 12:13











                                Laboratory Tests








Test


 21


11:09 21


11:14


 


Urine Collection Type Unknown   


 


Urine Color Klarissa   


 


Urine Clarity Hazy   


 


Urine pH


 6.0 (<5.0-8.0)


 





 


Urine Specific Gravity


 >=1.030


(1.000-1.030) 





 


Urine Protein


 100 mg/dL


(NEG-TRACE) 





 


Urine Glucose (UA)


 Negative mg/dL


(NEG) 





 


Urine Ketones (Stick)


 15 mg/dL (NEG)


 





 


Urine Blood Large (NEG)   


 


Urine Nitrite


 Positive (NEG)


 





 


Urine Bilirubin Small (NEG)   


 


Urine Urobilinogen Dipstick


 1.0 mg/dL (0.2


mg/dL) 





 


Urine Leukocyte Esterase Trace (NEG)   


 


Urine RBC


 6-10 /HPF


(0-2) 





 


Urine WBC


 5-10 /HPF


(0-4) 





 


Urine Squamous Epithelial


Cells Mod /LPF  


 





 


Urine Bacteria


 Moderate /HPF


(0-FEW) 





 


Urine Mucus Mod /LPF   


 


Urine Opiates Screen Neg (NEG)   


 


Urine Methadone Screen Neg (NEG)   


 


Urine Barbiturates Neg (NEG)   


 


Urine Phencyclidine Screen Neg (NEG)   


 


Urine


Amphetamine/Methamphetamine Neg (NEG)  


 





 


Urine Benzodiazepines Screen Neg (NEG)   


 


Urine Cocaine Screen Neg (NEG)   


 


Urine Cannabinoids Screen Neg (NEG)   


 


Urine Ethyl Alcohol Neg (NEG)   


 


POC Urine HCG, Qualitative


 


 Hcg negative


(Negative)








Vital Signs:





                                   Vital Signs








  Date Time  Temp Pulse Resp B/P (MAP) Pulse Ox O2 Delivery O2 Flow Rate FiO2


 


21 11:08 98.0 108 20 135/58 100   





 98.0       











EKG:


EKG:


EKG performed at 1109 today by ED nursing staff, heart rate 108 bpm sinus 

tachycardia without other ectopy, OK interval 0.124, QTc interval 0.430, no 

acute STEMI, no ACS, no acute ischemia noted, EKG was interpreted by ED 

attending Dr. Yo.





Radiology/Procedures:


Radiology/Procedures:


[]





Course & Med Decision Making:


Course & Med Decision Making


Pertinent Labs and Imaging studies reviewed. (See chart for details)





35-year-old female, vital signs reviewed, presented emergency department with 

chest pain, a work-up consisted of EKG, chest pain work-up, serum labs, urine 

assay.





EKG showed no STEMI, no acute ischemia, no ACS, however troponin I was elevated 

at 0.138.  Patient also had urine showed infection with hematuria.  Discussed 

case with inpatient management Dr. Cruz who agreed to assume care of 

patient with full admission to the CVC unit and will consult cardiology special

ty.  Discussed with patient need to admit to hospital, patient is amenable to 

this plan.  The patient was given 1 g Rocephin IV for initial antibiotic 

management of UTI.  Bridge orders were completed, patient awaiting bed on CVC 

unit at this time.





Dragon Disclaimer:


Dragon Disclaimer:


This electronic medical record was generated, in whole or in part, using a voice

 recognition dictation system.





Departure


Departure


Impression:  


   Primary Impression:  


   Elevated troponin I level


   Additional Impressions:  


   NSTEMI (non-ST elevation myocardial infarction)


   Chest pain


   Qualified Codes:  R07.89 - Other chest pain


   UTI (urinary tract infection)


   Qualified Codes:  N39.0 - Urinary tract infection, site not specified; R31.9 

   - Hematuria, unspecified


Disposition:  09 ADMITTED AS INPT THIS HOSP (TO DR. CRUZ TO CVC UNIT)


Condition:  GUARDED


Referrals:  


NO PCP (PCP)











MERCED PAGE       2021 12:16
Patient/Caregiver provided printed discharge information.

## 2021-05-16 ENCOUNTER — HOSPITAL ENCOUNTER (EMERGENCY)
Dept: HOSPITAL 61 - ER | Age: 36
Discharge: HOME | End: 2021-05-16
Payer: OTHER GOVERNMENT

## 2021-05-16 VITALS — SYSTOLIC BLOOD PRESSURE: 104 MMHG | DIASTOLIC BLOOD PRESSURE: 53 MMHG

## 2021-05-16 VITALS — BODY MASS INDEX: 38.95 KG/M2 | WEIGHT: 198.42 LBS | HEIGHT: 60 IN

## 2021-05-16 DIAGNOSIS — R06.02: ICD-10-CM

## 2021-05-16 DIAGNOSIS — F41.9: ICD-10-CM

## 2021-05-16 DIAGNOSIS — R07.89: Primary | ICD-10-CM

## 2021-05-16 LAB
ALBUMIN SERPL-MCNC: 3.7 G/DL (ref 3.4–5)
ALBUMIN/GLOB SERPL: 0.9 {RATIO} (ref 1–1.7)
ALP SERPL-CCNC: 83 U/L (ref 46–116)
ALT SERPL-CCNC: 26 U/L (ref 14–59)
ANION GAP SERPL CALC-SCNC: 7 MMOL/L (ref 6–14)
AST SERPL-CCNC: 18 U/L (ref 15–37)
BASOPHILS # BLD AUTO: 0.1 X10^3/UL (ref 0–0.2)
BASOPHILS NFR BLD: 1 % (ref 0–3)
BILIRUB SERPL-MCNC: 0.2 MG/DL (ref 0.2–1)
BUN SERPL-MCNC: 12 MG/DL (ref 7–20)
BUN/CREAT SERPL: 15 (ref 6–20)
CALCIUM SERPL-MCNC: 8.5 MG/DL (ref 8.5–10.1)
CHLORIDE SERPL-SCNC: 104 MMOL/L (ref 98–107)
CO2 SERPL-SCNC: 30 MMOL/L (ref 21–32)
CREAT SERPL-MCNC: 0.8 MG/DL (ref 0.6–1)
EOSINOPHIL NFR BLD: 0.1 X10^3/UL (ref 0–0.7)
EOSINOPHIL NFR BLD: 1 % (ref 0–3)
ERYTHROCYTE [DISTWIDTH] IN BLOOD BY AUTOMATED COUNT: 18.9 % (ref 11.5–14.5)
GFR SERPLBLD BASED ON 1.73 SQ M-ARVRAT: 80.7 ML/MIN
GLUCOSE SERPL-MCNC: 99 MG/DL (ref 70–99)
HCT VFR BLD CALC: 31.1 % (ref 36–47)
HGB BLD-MCNC: 9.4 G/DL (ref 12–15.5)
LIPASE: 216 U/L (ref 73–393)
LYMPHOCYTES # BLD: 2.2 X10^3/UL (ref 1–4.8)
LYMPHOCYTES NFR BLD AUTO: 26 % (ref 24–48)
MAGNESIUM SERPL-MCNC: 2.1 MG/DL (ref 1.8–2.4)
MCH RBC QN AUTO: 21 PG (ref 25–35)
MCHC RBC AUTO-ENTMCNC: 30 G/DL (ref 31–37)
MCV RBC AUTO: 68 FL (ref 79–100)
MONO #: 0.6 X10^3/UL (ref 0–1.1)
MONOCYTES NFR BLD: 7 % (ref 0–9)
NEUT #: 5.5 X10^3/UL (ref 1.8–7.7)
NEUTROPHILS NFR BLD AUTO: 65 % (ref 31–73)
PLATELET # BLD AUTO: 459 X10^3/UL (ref 140–400)
POTASSIUM SERPL-SCNC: 3.5 MMOL/L (ref 3.5–5.1)
PREG TEST PT QUAL: NEGATIVE
PROT SERPL-MCNC: 7.7 G/DL (ref 6.4–8.2)
RBC # BLD AUTO: 4.55 X10^6/UL (ref 3.5–5.4)
SODIUM SERPL-SCNC: 141 MMOL/L (ref 136–145)
WBC # BLD AUTO: 8.6 X10^3/UL (ref 4–11)

## 2021-05-16 PROCEDURE — 83880 ASSAY OF NATRIURETIC PEPTIDE: CPT

## 2021-05-16 PROCEDURE — 96361 HYDRATE IV INFUSION ADD-ON: CPT

## 2021-05-16 PROCEDURE — 83735 ASSAY OF MAGNESIUM: CPT

## 2021-05-16 PROCEDURE — 96375 TX/PRO/DX INJ NEW DRUG ADDON: CPT

## 2021-05-16 PROCEDURE — 84484 ASSAY OF TROPONIN QUANT: CPT

## 2021-05-16 PROCEDURE — 83690 ASSAY OF LIPASE: CPT

## 2021-05-16 PROCEDURE — 36415 COLL VENOUS BLD VENIPUNCTURE: CPT

## 2021-05-16 PROCEDURE — 99285 EMERGENCY DEPT VISIT HI MDM: CPT

## 2021-05-16 PROCEDURE — 71275 CT ANGIOGRAPHY CHEST: CPT

## 2021-05-16 PROCEDURE — 80053 COMPREHEN METABOLIC PANEL: CPT

## 2021-05-16 PROCEDURE — 96374 THER/PROPH/DIAG INJ IV PUSH: CPT

## 2021-05-16 PROCEDURE — 84703 CHORIONIC GONADOTROPIN ASSAY: CPT

## 2021-05-16 PROCEDURE — 85025 COMPLETE CBC W/AUTO DIFF WBC: CPT

## 2021-05-16 PROCEDURE — 93005 ELECTROCARDIOGRAM TRACING: CPT

## 2021-05-16 NOTE — PHYS DOC
Past Medical History


Past Medical History:  Anxiety


Additional Past Medical Histor:  heart murmur, unknown "heart history"


 (MERCED RUIZ DO)


Past Surgical History:  Appendectomy, Cholecystectomy, , Other


Additional Past Surgical Histo:  gastric bypass for mesenteric clot;


 (MERCED RUIZ DO)


Smoking Status:  Never Smoker


Alcohol Use:  Occasionally


Drug Use:  None


 (MERCED RUIZ DO)





General Adult


EDM:


Chief Complaint:  CHEST PAIN





HPI:


HPI:





Patient is a 36 year female presents with report of left upper quadrant sharp 

chest pain with radiation to left arm that started approximately 30 minutes 

prior to arrival.  Patient reports some shortness of air.  Denies trauma.  

Denies leg swelling or calf tenderness.  Patient reports significant family 

history of CAD with father who first had a heart attack at age 43.  Patient also

reports mother with history of PE.  Patient denies use of estrogen products.  

Denies pregnancy.  Reports she has an "" Nexplanon implant.  Denies fever

or chills.  Denies cough.  Denies known exposure to COVID-19.  Patient does 

report symptoms started after getting into an argument.  Patient reports taking 

some Xanax prior to arrival without significant improvement.  Patient does 

report history of prior admission in 2021.  Per [facility] review at that 

time patient had serial troponins however initial troponin was elevated at 0.12.

 Patient was diagnosed with atypical chest pain which was thought to be 

secondary to anxiety.


 (MERCED RUIZ DO)





Review of Systems:


Review of Systems:





Constitutional: Denies fever or chills 


Eyes: Denies redness or eye pain 


HENT: Denies nasal congestion or sore throat


Respiratory: Denies cough; reports shortness of breath 


Cardiovascular: Reports chest pain; denies palpitations


GI: Denies abdominal pain, nausea, or vomiting


: Denies dysuria or hematuria


Musculoskeletal: Denies back pain or joint pain


Integument: Denies rash or skin lesions 


Neurologic: Denies headache, focal weakness or sensory changes





Complete systems were reviewed and found to be within normal limits, except as 

documented in this note.


 (MERCED RUIZ DO)





Heart Score:


C/O Chest Pain:  Yes


HEART Score for Chest Pain:  








HEART Score for Chest Pain Response (Comments) Value


 


History Moderately Suspicious 1


 


ECG Normal 0


 


Age < 45 0


 


Risk Factors                            1 or 2 Risk Factors 1


 


Troponin < Normal Limit 0


 


Total  2








Risk Factors:


Risk Factors:  DM, Current or recent (<one month) smoker, HTN, HLP, family 

history of CAD, obesity.


Risk Scores:


Score 0 - 3:  2.5% MACE over next 6 weeks - Discharge Home


Score 4 - 6:  20.3% MACE over next 6 weeks - Admit for Clinical Observation


Score 7 - 10:  72.7% MACE over next 6 weeks - Early Invasive Strategies


 (MERCED RUIZ DO)


C/O Chest Pain:  Yes


HEART Score for Chest Pain:  








HEART Score for Chest Pain Response (Comments) Value


 


History Slighlty/Non-Suspicious 0


 


ECG Normal 0


 


Age < 45 0


 


Risk Factors No Risk Factors 0


 


Troponin < Normal Limit 0


 


Total  0








 (BIN SEGOVIA DO)





Current Medications:





Current Medications








 Medications


  (Trade)  Dose


 Ordered  Sig/Reji  Start Time


 Stop Time Status Last Admin


Dose Admin


 


 Aspirin


  (Fiona Aspirin)  325 mg  1X  ONCE  21 18:15


 21 18:16 DC 21 19:16


325 MG


 


 Info


  (CONTRAST GIVEN


 -- Rx MONITORING)  1 each  PRN DAILY  PRN  21 19:00


 21 18:59   





 


 Iohexol


  (Omnipaque 350


 Mg/ml)  100 ml  1X  ONCE  21 18:45


 21 18:48 DC 21 19:03


100 ML


 


 Ketorolac


 Tromethamine


  (Toradol 15mg


 Vial)  15 mg  1X  ONCE  21 18:15


 21 18:44 DC 21 19:17


15 MG


 


 Orphenadrine


 Citrate


  (Norflex)  60 mg  1X  ONCE  21 18:15


 21 18:16 DC 21 19:18


60 MG


 


 Sodium Chloride  1,000 ml @ 


 1,000 mls/hr  1X  ONCE  21 18:15


 21 19:14 DC 21 19:16


1,000 MLS/HR








 (MERCED RUIZ DO)





Allergies:


Allergies:





Allergies








Coded Allergies Type Severity Reaction Last Updated Verified


 


  No Known Drug Allergies    21 No








 (MERCED RUIZ DO)





Physical Exam:


PE:





Constitutional: Well developed, well nourished, anxious, non-toxic appearance


HENT: Normocephalic, atraumatic


Eyes: Conjunctiva normal, no discharge


Neck: Normal range of motion, supple


Lungs & Thorax:  No respiratory distress, equal chest rise and fall


Abdomen: Soft, no tenderness


Skin: Warm, dry, no erythema, no rash


Extremities: No calf tenderness, ROM intact, no edema


Neurologic: Alert and oriented X 3, no focal deficits noted


Psychologic: Affect anxious, judgment normal


 (MERCED RUIZ DO)





Current Patient Data:


Labs:





                                Laboratory Tests








Test


 21


17:20


 


White Blood Count


 8.6 x10^3/uL


(4.0-11.0)


 


Red Blood Count


 4.55 x10^6/uL


(3.50-5.40)


 


Hemoglobin


 9.4 g/dL


(12.0-15.5)  L


 


Hematocrit


 31.1 %


(36.0-47.0)  L


 


Mean Corpuscular Volume


 68 fL ()


L


 


Mean Corpuscular Hemoglobin


 21 pg (25-35)


L


 


Mean Corpuscular Hemoglobin


Concent 30 g/dL


(31-37)  L


 


Red Cell Distribution Width


 18.9 %


(11.5-14.5)  H


 


Platelet Count


 459 x10^3/uL


(140-400)  H


 


Neutrophils (%) (Auto) 65 % (31-73)  


 


Lymphocytes (%) (Auto) 26 % (24-48)  


 


Monocytes (%) (Auto) 7 % (0-9)  


 


Eosinophils (%) (Auto) 1 % (0-3)  


 


Basophils (%) (Auto) 1 % (0-3)  


 


Neutrophils # (Auto)


 5.5 x10^3/uL


(1.8-7.7)


 


Lymphocytes # (Auto)


 2.2 x10^3/uL


(1.0-4.8)


 


Monocytes # (Auto)


 0.6 x10^3/uL


(0.0-1.1)


 


Eosinophils # (Auto)


 0.1 x10^3/uL


(0.0-0.7)


 


Basophils # (Auto)


 0.1 x10^3/uL


(0.0-0.2)


 


Sodium Level


 141 mmol/L


(136-145)


 


Potassium Level


 3.5 mmol/L


(3.5-5.1)


 


Chloride Level


 104 mmol/L


()


 


Carbon Dioxide Level


 30 mmol/L


(21-32)


 


Anion Gap 7 (6-14)  


 


Blood Urea Nitrogen


 12 mg/dL


(7-20)


 


Creatinine


 0.8 mg/dL


(0.6-1.0)


 


Estimated GFR


(Cockcroft-Gault) 80.7  





 


BUN/Creatinine Ratio 15 (6-20)  


 


Glucose Level


 99 mg/dL


(70-99)


 


Calcium Level


 8.5 mg/dL


(8.5-10.1)


 


Magnesium Level


 2.1 mg/dL


(1.8-2.4)


 


Total Bilirubin


 0.2 mg/dL


(0.2-1.0)


 


Aspartate Amino Transferase


(AST) 18 U/L (15-37)





 


Alanine Aminotransferase (ALT)


 26 U/L (14-59)





 


Alkaline Phosphatase


 83 U/L


()


 


Troponin I Quantitative


 < 0.017 ng/mL


(0.000-0.055)


 


NT-Pro-B-Type Natriuretic


Peptide 59 pg/mL


(0-124)


 


Total Protein


 7.7 g/dL


(6.4-8.2)


 


Albumin


 3.7 g/dL


(3.4-5.0)


 


Albumin/Globulin Ratio


 0.9 (1.0-1.7)


L


 


Lipase


 216 U/L


()


 


Serum Pregnancy Test,


Qualitative Negative (NEG)








                                Laboratory Tests


21 17:20








                                Laboratory Tests


21 17:20








Vital Signs:





                                   Vital Signs








  Date Time  Temp Pulse Resp B/P (MAP) Pulse Ox O2 Delivery O2 Flow Rate FiO2


 


21 17:05 98.1 74 16 114/61 (78) 100 Room Air  





 98.1       








 (MERCED RUIZ DO)





EKG:


EKG:


@1633 NSR at 83bpm, NO ST elevation, QRS 82ms, QT/QTc 376/448ms


 (MERCED RUIZ DO)





Radiology/Procedures:


Radiology/Procedures:


[]


 (MERCED RUIZ DO)





Course & Med Decision Making:


Course & Med Decision Making


Pertinent Labs and Imaging studies reviewed. (See chart for details)





Patient presents with pleuritic chest pain which started prior to arrival.  

Patient with history of cardiac evaluation in 2021.  Patient does report

 some increased life stressors.  Reports had recently gotten into an argument at

 home.  Patient does report cardiac risk factors of family history of CAD as 

well as PE risk factors secondary to family history.  Denies trauma.  Denies fe

jennifer or chills.





EKG stable.  Labs obtained and posted to chart.  Initial troponin within normal 

limits.  CTA chest obtained and pending radiological report.





Symptomatic treatment provided.





- Signout given to Dr. Segovia for further evaluation and final disposition. 

 Discussed current findings and plan with patient, who acknowledges 

understanding and agreement.


 (MERCED RUIZ DO)


Course & Med Decision Making


Assumed care at shift change disposition pending CT imaging.  Results reviewed 

and discussed with patient.  Troponin within normal limits EKG no acute ischemic

 changes.  Patient be discharged home.  Patient also was noted to have urinary 

tract infection will see if Keflex.


 (BIN SEGOVIA DO)


Dragon Disclaimer:


Dragon Disclaimer:


This electronic medical record was generated, in whole or in part, using a voice

 recognition dictation system.


 (MERCED RUIZ DO)





Departure


Departure


Impression:  


   Primary Impression:  


   Chest pain


   Qualified Codes:  R07.9 - Chest pain, unspecified


Disposition:   HOME / SELF CARE / HOMELESS


Referrals:  


LILLIG,SHAWN P MD (PCP)


Patient Instructions:  Chest Pain (Nonspecific), Urinary Tract Infection


Scripts


Cephalexin (KEFLEX) 750 Mg Capsule


1 CAP PO BID for 7 Days, #14 CAP 0 Refills


   Prov: BIN SEGOVIA DO         21











MERCED RUIZ DO             May 16, 2021 19:37


BIN SEGOVIA DO            May 16, 2021 20:53

## 2021-05-16 NOTE — EKG
[facility]

              [address] [place] [address]

Test Date:    2021               Test Time:    16:33:06

Pat Name:     FRANCISCO CASTELLANOS           Department:   

Patient ID:   [id number]           Room:          

Gender:       F                        Technician:   

:          [id number]               Requested By: MERCED RUIZ

Order Number: [id number]           Reading MD:     

                                 Measurements

Intervals                              Axis          

Rate:         83                       P:            43

ID:           [id number]                      QRS:          53

QRSD:         82                       T:            47

QT:           376                                    

QTc:          448                                    

                           Interpretive Statements

SINUS RHYTHM

NORMAL ECG

RI6.02

No previous ECG available for comparison

## 2021-05-16 NOTE — RAD
PQRS Compliance Statement:



One or more of the following individualized dose reduction techniques were utilized for this examinat
ion:  

1. Automated exposure control  

2. Adjustment of the mA and/or kV according to patient size  

3. Use of iterative reconstruction technique



CTA CHEST 5/16/2021 6:44 PM



INDICATION: Pleuritic chest pain



COMPARISON: CT chest 1/11/2021



TECHNIQUE: Axial CT images of the chest were obtained after the intravenous administration of nonioni
c contrast. Coronal and sagittal reformats are provided. Maximum intensity projection images of the t
horacic vasculature are provided.



FINDINGS:



The thyroid gland is normal in appearance. There are no pathologically enlarged axillary, mediastinal
 or hilar lymph nodes. The heart size is within normal limits. No significant pericardial effusion. T
horacic aorta is normal in course and caliber. There is an aberrant right subclavian artery with retr
oesophageal course.



There is adequate opacification of the pulmonary arterial system. There there are no filling defects 
within the pulmonary arterial system to suggest acute or chronic pulmonary embolus.



6 mm subpleural solid noncalcified pulmonary nodule identified within the left upper lobe posteriorly
 (series 3, image 37). There are no pulmonary infiltrates. There are no pleural effusions. No pulmona
ry vascular congestion or pneumothorax.



Gastric bypass postsurgical changes. Cholecystectomy. No suspicious osseous lesions are visualized.



IMPRESSION:



There is no evidence for acute or chronic pulmonary embolism.



Aberrant right subclavian artery with retroesophageal course.



6 mm subpleural solid noncalcified pulmonary nodule in the left upper lobe posteriorly. Fleischner gu
idelines for incidentally detected pulmonary nodules suggests CT chest at 6-12 months, then consider 
CT at 18-24 months for single solid noncalcified pulmonary nodule 6-8 mm in size. This finding is not
 seen on 1/11/2021.



Electronically signed by: Fernanda Monteiro MD (5/16/2021 8:15 PM) [facility]